# Patient Record
Sex: FEMALE | Employment: OTHER | URBAN - METROPOLITAN AREA
[De-identification: names, ages, dates, MRNs, and addresses within clinical notes are randomized per-mention and may not be internally consistent; named-entity substitution may affect disease eponyms.]

---

## 2021-01-06 LAB
CREATININE (EXTERNAL): 1.29 MG/DL (ref 0.57–1.11)
GFR ESTIMATED (EXTERNAL): 50 ML/MIN/1.73M2
GLUCOSE (EXTERNAL): 87 MG/DL (ref 70–100)
POTASSIUM (EXTERNAL): 4.3 MMOL/L (ref 3.5–5.1)

## 2021-06-29 ENCOUNTER — TRANSFERRED RECORDS (OUTPATIENT)
Dept: HEALTH INFORMATION MANAGEMENT | Facility: CLINIC | Age: 46
End: 2021-06-29
Payer: MEDICARE

## 2021-07-01 ENCOUNTER — TRANSFERRED RECORDS (OUTPATIENT)
Dept: HEALTH INFORMATION MANAGEMENT | Facility: CLINIC | Age: 46
End: 2021-07-01
Payer: MEDICARE

## 2021-07-01 LAB
CREATININE (EXTERNAL): 1.07 MG/DL (ref 0.57–1.11)
GFR ESTIMATED (EXTERNAL): >60 ML/MIN/1.73M2
GLUCOSE (EXTERNAL): 92 MG/DL (ref 70–100)
POTASSIUM (EXTERNAL): 4.8 MMOL/L (ref 3.5–5.1)

## 2021-08-03 ENCOUNTER — TRANSFERRED RECORDS (OUTPATIENT)
Dept: HEALTH INFORMATION MANAGEMENT | Facility: CLINIC | Age: 46
End: 2021-08-03

## 2021-08-04 ENCOUNTER — REFERRAL (OUTPATIENT)
Dept: TRANSPLANT | Facility: CLINIC | Age: 46
End: 2021-08-04

## 2021-08-05 PROBLEM — Z79.899 OTHER LONG TERM (CURRENT) DRUG THERAPY: Status: ACTIVE | Noted: 2019-11-18

## 2021-08-05 PROBLEM — N18.30 CHRONIC KIDNEY DISEASE, STAGE 3 (H): Status: ACTIVE | Noted: 2021-08-05

## 2021-08-05 PROBLEM — K58.9 IBS (IRRITABLE BOWEL SYNDROME): Status: ACTIVE | Noted: 2021-08-05

## 2021-08-05 PROBLEM — G25.81 RLS (RESTLESS LEGS SYNDROME): Status: ACTIVE | Noted: 2021-08-05

## 2021-08-05 NOTE — TELEPHONE ENCOUNTER
August 5, 2021 11:41 AM -  [id number]:   I received a referral for a patient to establish care with a post K/P transplant provider. She was transplanted at [facility] in 2016 and saw a provider in [place] until her recent move to [place]. She would like to establish care with a post tx provider that can see her in [place]. I launched [facility] and sent her a code to sign up for [facility].

## 2021-10-28 ENCOUNTER — TELEPHONE (OUTPATIENT)
Dept: TRANSPLANT | Facility: CLINIC | Age: 46
End: 2021-10-28

## 2021-10-28 NOTE — TELEPHONE ENCOUNTER
Provider Call: General  Route to LPN    Reason for call: Call from [facility]  Dr Chacon wanted them to check with status of her transfer of care and to see Dr Root  Referral was sent in Aug 2021.  Intake received  records  But should be transfer of care not new transplant referral  They closed referral     Call back needed? Yes    Return Call Needed  Same as documented in contacts section  When to return call?: Greater than one day: Route standard priority

## 2021-10-29 NOTE — TELEPHONE ENCOUNTER
RNCC spoke with nursing at [facility].  Explained delinquency of transfer of care. Dr. Chacon discussed with Lisbeth - would like to follow at [facility].     Will discuss with Lisbeth if she intends to have any ongoing care at [facility]; found in CE:  Marked as Not Followed on 11/18/2019 (by [facility])  Reason: Transferred to Another Center       Kidney, Pancreas Coordinator  Maritza Bhagat, ED    Phone: [phone]            Most recent labs in CE:  Tacro at goal.   Serum creatinine 1.12, at baseline per [facility] nephrology note 2019.  Lipase WNL, at baseline per [facility] nephrology note 2019.  Surgical discharge note available from [facility] available in [facility] from dated 11/7/16.    Labs from [facility] available from 1/21 to 9/21 (no results from 1/21 to 7/21).    Transfer to [place] March 2020.    Will need:   full 1 year's worth of transplant labs.   HLA typing  Operative note from [facility].   Sign up for [facility] does meet criteria for transfer to [facility] SOT.   Will establish care with Dr. Root at [facility] outreach: in-person 11/19 at .      Will need:  Orders placed per protocol under Dr. Root's name & sent to patient's lab of choice.

## 2021-11-04 NOTE — TELEPHONE ENCOUNTER
[facility] spoke with Lisbeth.   She is would like to pursue transfer and will connect with [facility] to obtain most recent 1 years lab work and most recent transplant nephrology note.    Lisbeth signed up for [employer] and was able to view the appointment with Dr. Root on 11/19.  [facility] notified her of her transplant coordinator contact.  [facility] advised Lisbeth to place her insurance information into our system via [employer].    [facility] reached out to [facility] for HLA typing as well as surgical operative report, but [facility] offices close at 4p. Will try again tomorrow.

## 2021-11-05 NOTE — LETTER
OUTPATIENT LABORATORY TEST ORDER:  After First Year    Patient Name: Lisbeth Umaña                             Transplant Date: 11/7/2016  YOB: 1975                                               Issue Date & Time: 11/5/2021 1052am    [facility] MR: [id number]                                                 Exp. Date (1 year after date issued)    Diagnoses:   Kidney Transplant (ICD-10  Z94.0)      Pancreas Transplant (ICD-10  Z94.83)     Long term use of medications (ICD-10  Z79.899)              Lab results to be available on the same day drawn.   Please release results to patient upon their request    Please fax to the Transplant Center at [phone].    Monthly   Complete Blood Count with Platelets    Basic Metabolic Panel (Sodium, Potassium, Chloride, CO2, Creatinine, Urea Nitrogen, Glucose, Calcium)   Amylase, Lipase   /Tacrolimus/Prograf drug level     Every 6 months            BK PCR Quantitative/Serum               Complete Lipid Panel fasting (Cholesterol, Triglycerides, HDL, LDL)            Glycosylated Hemoglobin (A1c)              Urine for Protein/Creatinine    Yearly         DSA/PRA  1. Please draw 20ml of blood in red top (plain) tube for Antileukocyte Antibody (ALA / PRA).  2. Label tubes with the patient s name, complete lab slip.    3. Mailers, lab slips with instructions are sent to patient separately.  4. Call the Outreach Lab at [phone] to reorder mailers.  5. Mail blood to (this address is also on the mailers):  IMMUNOLOGY LABORATORY  [facility]  [address]    [address] [place]  [address]    If you have any questions, please call The Transplant Center at [phone] or [phone].      .

## 2021-11-05 NOTE — TELEPHONE ENCOUNTER
PCS left contacted [facility], awaiting response back regarding receipt of HLA and operative report.     Response received. Records requested for retrieval from [facility] - HLA Typing, Donor DSAs.    Received communication from [place] transplant coordinator - fax number given, she will be sending operative report they received from [facility], transplant nephrology note and 1 years worth of labs.

## 2021-11-19 ENCOUNTER — OFFICE VISIT (OUTPATIENT)
Dept: NEPHROLOGY | Facility: CLINIC | Age: 46
End: 2021-11-19
Payer: MEDICARE

## 2021-11-19 VITALS
BODY MASS INDEX: 39.45 KG/M2 | HEIGHT: 68 IN | DIASTOLIC BLOOD PRESSURE: 86 MMHG | SYSTOLIC BLOOD PRESSURE: 114 MMHG | HEART RATE: 66 BPM | WEIGHT: 260.3 LBS

## 2021-11-19 DIAGNOSIS — R06.09 DYSPNEA ON EXERTION: ICD-10-CM

## 2021-11-19 DIAGNOSIS — D84.9 IMMUNOSUPPRESSION (H): Primary | ICD-10-CM

## 2021-11-19 DIAGNOSIS — Z94.83 PANCREAS REPLACED BY TRANSPLANT (H): ICD-10-CM

## 2021-11-19 DIAGNOSIS — Z94.0 KIDNEY REPLACED BY TRANSPLANT: ICD-10-CM

## 2021-11-19 DIAGNOSIS — E66.01 MORBID OBESITY (H): ICD-10-CM

## 2021-11-19 DIAGNOSIS — Z48.298 AFTERCARE FOLLOWING ORGAN TRANSPLANT: ICD-10-CM

## 2021-11-19 DIAGNOSIS — N18.31 STAGE 3A CHRONIC KIDNEY DISEASE (H): ICD-10-CM

## 2021-11-19 DIAGNOSIS — E55.9 VITAMIN D DEFICIENCY: ICD-10-CM

## 2021-11-19 PROCEDURE — 99205 OFFICE O/P NEW HI 60 MIN: CPT | Performed by: INTERNAL MEDICINE

## 2021-11-19 RX ORDER — TACROLIMUS 1 MG/1
CAPSULE ORAL
COMMUNITY
End: 2022-05-09

## 2021-11-19 RX ORDER — FUROSEMIDE 20 MG
20 TABLET ORAL DAILY PRN
COMMUNITY
Start: 2021-08-13

## 2021-11-19 RX ORDER — PREDNISONE 5 MG/1
5 TABLET ORAL DAILY
COMMUNITY
Start: 2020-11-24

## 2021-11-19 RX ORDER — CALCIUM CARBONATE 750 MG/1
600 TABLET, CHEWABLE ORAL 2 TIMES DAILY
COMMUNITY

## 2021-11-19 RX ORDER — ERGOCALCIFEROL 1.25 MG/1
50000 CAPSULE ORAL WEEKLY
COMMUNITY
Start: 2021-02-25 | End: 2022-02-25

## 2021-11-19 RX ORDER — SULFAMETHOXAZOLE/TRIMETHOPRIM 800-160 MG
1 TABLET ORAL PRN
COMMUNITY
Start: 2021-08-03

## 2021-11-19 RX ORDER — CYCLOBENZAPRINE HCL 10 MG
10 TABLET ORAL PRN
COMMUNITY
Start: 2021-02-25 | End: 2022-02-25

## 2021-11-19 RX ORDER — HYOSCYAMINE SULFATE 0.125 MG
0.12 TABLET ORAL PRN
COMMUNITY
Start: 2021-02-25 | End: 2022-02-25

## 2021-11-19 RX ORDER — TRAZODONE HYDROCHLORIDE 100 MG/1
100 TABLET ORAL AT BEDTIME
COMMUNITY
Start: 2020-11-24

## 2021-11-19 RX ORDER — DOCUSATE SODIUM 100 MG/1
100 CAPSULE, LIQUID FILLED ORAL PRN
COMMUNITY

## 2021-11-19 RX ORDER — ONDANSETRON 4 MG/1
4 TABLET, FILM COATED ORAL EVERY 8 HOURS PRN
COMMUNITY

## 2021-11-19 RX ORDER — LEVOTHYROXINE SODIUM 200 UG/1
200 TABLET ORAL DAILY
COMMUNITY
Start: 2021-08-03 | End: 2023-07-06

## 2021-11-19 RX ORDER — GABAPENTIN 800 MG/1
800 TABLET ORAL 2 TIMES DAILY
COMMUNITY
Start: 2021-02-25 | End: 2024-07-26

## 2021-11-19 RX ORDER — ESTRADIOL 10 UG/1
1 INSERT VAGINAL PRN
COMMUNITY
Start: 2020-12-05 | End: 2024-07-26

## 2021-11-19 ASSESSMENT — MIFFLIN-ST. JEOR: SCORE: 1874.21

## 2021-11-19 NOTE — LETTER
11/19/2021     RE: Lisbeth Umaña  [address] [place] [address]     Dear Colleague,    Thank you for referring your patient, Lisbeth Umaña, to the [facility] K[facility] at [facility]. Please see a copy of my visit note below.    TRANSPLANT NEPHROLOGY CONSULT NOTE    Assessment & Plan   # DDKT (SPK): Stable   - Baseline Creatinine  ~ 1.1-1.3   - Proteinuria: Normal (<0.2 grams)   - Date DSA Last Checked: Not Known      Latest DSA: Transplanted at outside Transplant Program   - BK Viremia: No   - Kidney Tx Biopsy: 2017; Results: (Per Patient) No diagnostic evidence of acute rejection.    # Pancreas Tx (SPK):    - Pancreatic Exocrine Drainage: Enteric drained     - Blood glucose: Euglycemia      On insulin: No   - HbA1c: Stable      Latest HbA1c: 5.2%   - Pancreatic enzymes: Stable   - Date DSA Last Checked: Not Known  Latest DSA: Transplanted at outside Transplant Program   - Pancreas Tx Biopsy: No    # Immunosuppression: Tacrolimus immediate release (goal 5-8), Mycophenolic acid (dose 540 mg every 12 hours) and Prednisone (dose 5 mg daily)          - Continue with intensive monitoring of immunosuppression for efficacy and toxicity.          - Changes: No    # Infection Prophylaxis:   Last CD4 Level: Not checked  - PJP: None    # Blood Pressure: Controlled;  Goal BP: < 130/80   - Changes: No; Patient is on furosemide for chronic edema only    # Mineral Bone Disorder:   - Vitamin D; level: Low        On supplement: Yes  - Calcium; level: Normal        On supplement: Yes    # Recurrent UTIs: Patient has associated this frequently with post coitus episodes and will take Bactrim for prophylaxis.  Stable symptoms.   - Will check CD4 and IgG levels to gauge level of immunosuppression to see if that might be contributing.    # Obesity, Class II (BMI = 39.6): Stable weight over the last year, but patient reports gaining about 90 lbs over the last 5  years since her transplant.   - Recommend weight loss for overall health by increasing exercise and watching caloric intake.    # Dyspnea on Exertion: This has been a chronic problem for the patient for years.  She has been evaluated previously by Pulmonary and Cardiology with no obvious cause.  Patient was tried on inhalers without improvement and only developed side effects from them.  Overall stable, but ongoing symptoms.    # Diabetic Gastroparesis: Patients reports having to watch a strict gastroparesis diet or she will get symptoms.  Stable symptoms lately with only occasional episodes.    # GERD: Controlled on omeprazole.    # Irritable Bowel Syndrome and Gastroparesis Dumping Syndrome: Intermittent diarrhea and constipation symptoms.  More so diarrheal episodes recently, especially with dumping syndrome with her diabetes if she doesn't follow a gastroparesis diet.    # Chronic Peripheral Neuropathy and Lower Extremity Edema: Stable on furosemide and gabapentin.    # Chronic Pain: Patient reports chronic pain for several reasons, including chronic lower back pain, knee pain and shoulder pain.  She has recently received steroid injections with some improvement in symptoms.  Followed by Pain Clinic.    # Skin Cancer Risk:    - Discussed sun protection and recommend regular follow up with Dermatology.    # Medical Compliance: Yes    # COVID-19 Virus Review: Discussed COVID-19 virus and the potential medical risks.  Reviewed preventative health recommendations, including wearing a mask where appropriate.  Recommended COVID vaccination should be up to date with either an initial vaccination or booster shot when appropriate.  Asked the patient to inform the transplant center if they are exposed or diagnosed with this virus.    # COVID Vaccination Up To Date: Yes     # Transplant History:  Etiology of Kidney Failure: Diabetes mellitus type 1  Tx: SPK  Transplant: 11/7/2016 (Kidney / Pancreas)  Significant changes in  "immunosuppression: None  Significant transplant-related complications: Recurrent UTIs      Transplant Office Phone Number: [phone]    Assessment and plan was discussed with the patient and she voiced her understanding and agreement.    Return visit: Return in about 1 year (around 11/19/2022).    Andres Root MD      Reason for Consult   Lisbeth Umaña was seen in consultation at the request of Dr. Can for kidney transplant, pancreas transplant and immunosuppression management.    Chief Complaint   Ms. Umaña is a 46 year old here for kidney transplant, pancreas transplant and immunosuppression management.    History of Present Illness    Ms. Umaña has a long-standing history of diabetes mellitus type 1 diagnosed in 1984 at age 9 complicated by triopathy and gastroparesis.  She developed end stage kidney disease and was previously on HD, now s/p SPK 11/7/16 at [facility].  Her induction was with basiliximab.  She was CMV discordant (D+/R-).  Patient reports doing pretty well post transplant, but had an episode of pancreatitis that required readmission, although no treatment for rejection.  She also notes a few months after transplant her creatinine increased from baseline and patient reports having a kidney transplant biopsy done that was reportedly unremarkable, although there is no report of a biopsy in her transplant records.  Her baseline creatinine has been ~ 1.0-1.2 on tacrolimus, mycophenolic acid and prednisone.    She also has a history of recurrent UTIs, morbid obesity, chronic pain, as well as her diabetic complications.  Patient recently moved to the [facility] and wants to establish care with a transplant center.    Her energy level is \"not great,\" although no recent change.  She is active, but really gets minimal exercise.  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  This has led her to gain ~ 90 lbs since her transplant 5 years ago.  Patient denies any chest pain, but continues to have shortness of breath with even minimal exertion.  These symptoms have progressively worsened since her transplant.  She has previously been worked up by a pulmonologist with a V/Q scan and PFTs, along with a Cardiologist with a cardiac stress test done, although those results are not available at this time.  Patient was tried on pulmonary inhalers without any improvement, but did develop some side effects, so she stopped them.  There are also significant reports of ongoing stress at work because of these symptoms and patient reports she had to quit being a nurse due to ongoing shortness of breath at work.  Patient also reports ongoing chronic joint pain, which she feels is related to the shortness of breath.  She was seen by a Rheumatologist and reportedly had an unremarkable work up.  More recently she was seen in Pain Clinic and received some steroid injections, which has really helped her knee and shoulder pain, as well as some improvement in her chronic back pain.  At present, it seems her symptoms are stable to improved.  In addition, patient has had ongoing lower extremity edema.  She had hypertension prior to transplant, but this resolved after her transplant and now she takes furosemide just for her leg swelling, but she has to be careful not to take too much or her blood pressure will go as low as the 80s systolic.  She generally runs in the 110s systolic now.    Appetite is okay with stable weight lately.  If she follows her gastroparesis diet, she only occasionally gets nausea and vomiting.  She has a history of irritable bowel syndrome, mostly with diarrhea lately and will get loose stools from gastroparesis dumping if she doesn't watch her diet.  No bloody or black, tarry stools.  Heartburn is controlled on PPI.  No fever, sweats or chills.    Recent Hospitalizations:  [x] No  [] Yes    New Medical Issues: [x] No [] Yes    Decreased energy: [] No [x] Yes    Chest pain or SOB with exertion:  [] No [x] Yes    Appetite change or weight change: [x] No [] Yes    Nausea, vomiting or diarrhea:  [] No [x] Yes    Fever, sweats or chills: [x] No [] Yes    Leg swelling: [] No [x] Yes      Home BP: 110/60-70s    Review of Systems   A comprehensive review of systems was obtained and negative, except as noted in the HPI or PMH.    Problem List   Patient Active Problem List   Diagnosis     Chronic kidney disease, stage 3 (H)     Diabetic gastroparesis (H)     Irritable bowel syndrome     Immunosuppression (H)     Kidney replaced by transplant     Pancreas replaced by transplant (H)     Diabetes mellitus type 1 (H)     Morbid obesity (H)     Recurrent UTI (urinary tract infection)     Hypothyroidism due to Hashimoto's thyroiditis     GERD (gastroesophageal reflux disease)     Female stress incontinence     Diabetic retinopathy of both eyes (H)     Diabetic peripheral neuropathy (H)     Chronic edema     Chronic diarrhea     Vitamin D deficiency     Aftercare following organ transplant     Dyspnea on exertion     Chronic pain       Past Medical History    Past Medical History:   Diagnosis Date     Anemia in chronic renal disease      Chronic diarrhea      Chronic edema      Chronic kidney disease, stage 3 (H) 08/05/2021     Diabetes mellitus type 1 (H)      Diabetic gastroparesis (H)      Diabetic peripheral neuropathy (H)      Diabetic retinopathy of both eyes (H)      End stage renal disease (H)      Female stress incontinence      Fibrocystic breast disease      GERD (gastroesophageal reflux disease)      Hypertension, renal      Hypothyroidism due to Hashimoto's thyroiditis      Immunosuppression (H)      Irritable bowel syndrome      Kidney replaced by transplant      Morbid obesity (H)      Pancreas replaced by transplant (H)      Recurrent UTI (urinary tract infection)      Secondary renal  hyperparathyroidism (H)      Seizure due to hypoglycemia (H)      Vitamin D deficiency        Past Surgical History   Past Surgical History:   Procedure Laterality Date     ADENOIDECTOMY       KIDNEY AND PANCREAS COMBINED TRANSPLANTATION       SHOULDER SURGERY Right      WISDOM TOOTH EXTRACTION         Family History   Family History   Problem Relation Age of Onset     Hypertension Mother      Diabetes Type 2  Mother      Kidney Disease Mother         Glucosuria     Gout Mother      Hypertension Father      Diabetes Type 2  Father      Prostate Cancer Father      Sarcoidosis Father      Hypertension Brother      Liver Disease Brother      Breast Cancer Maternal Grandmother        Social History   Social History     Tobacco Use     Smoking status: Former Smoker     Smokeless tobacco: Never Used   Substance Use Topics     Alcohol use: Not Currently     Alcohol/week: 0.0 - 1.0 standard drinks     Drug use: Never       Allergies   Allergies   Allergen Reactions     Iletin Ii Nph, Hives     Nitrofurantoin Hives, Itching and Nausea     Penicillins Hives       Medications   Current Outpatient Medications   Medication Sig     calcium carbonate 750 MG CHEW Take 600 mg by mouth 2 times daily     cyclobenzaprine (FLEXERIL) 10 MG tablet Take 10 mg by mouth as needed     docusate sodium (DSS) 100 MG capsule Take 100 mg by mouth as needed     ergocalciferol (ERGOCALCIFEROL) 1.25 MG (66035 UT) capsule Take 50,000 Units by mouth once a week     estradiol (VAGIFEM) 10 MCG TABS vaginal tablet Place 1 tablet vaginally twice a week     furosemide (LASIX) 20 MG tablet Take 20 mg by mouth every morning     gabapentin (NEURONTIN) 800 MG tablet Take 800 mg by mouth 2 times daily     hyoscyamine (LEVSIN) 0.125 MG tablet Take 0.125 mg by mouth as needed     levothyroxine (SYNTHROID) 200 MCG tablet Take 200 mcg by mouth daily     lidocaine (XYLOCAINE) 2 % external gel as needed     Multiple Vitamin (MULTIVITAMIN ADULT PO) Take by mouth daily  "    Mycophenolate Mofetil (CELLCEPT PO) Take 540 mg by mouth 2 times daily 180 mg tablets.     omeprazole (PRILOSEC) 20 MG DR capsule Take 20 mg by mouth daily     ondansetron (ZOFRAN) 4 MG tablet Take 4 mg by mouth every 8 hours as needed     predniSONE (DELTASONE) 5 MG tablet Take 5 mg by mouth daily     sulfamethoxazole-trimethoprim (BACTRIM DS) 800-160 MG tablet Take 1 tablet by mouth as needed     tacrolimus (GENERIC EQUIVALENT) 1 MG capsule      traZODone (DESYREL) 100 MG tablet Take 100 mg by mouth At Bedtime     No current facility-administered medications for this visit.     There are no discontinued medications.    Physical Exam   Vital Signs: /86 (BP Location: Right arm, Patient Position: Sitting, Cuff Size: Adult Regular)   Pulse 66   Ht 1.727 m (5' 8\")   Wt 118.1 kg (260 lb 4.8 oz)   BMI 39.58 kg/m      GENERAL APPEARANCE: alert and no distress  HENT: mouth without ulcers or lesions  LYMPHATICS: no cervical or supraclavicular nodes  RESP: lungs clear to auscultation - no rales, rhonchi or wheezes  CV: regular rhythm, normal rate, no rub, no murmur  EDEMA: trace LE edema bilaterally  ABDOMEN: soft, nondistended, nontender, bowel sounds normal, morbidly obese  MS: extremities normal - no gross deformities noted, no evidence of inflammation in joints, no muscle tenderness  SKIN: no rash  TX KIDNEY: normal  DIALYSIS ACCESS:  LUE AV fistula with good thrill    Data     Renal Latest Ref Rng & Units 7/1/2021 1/6/2021   K (external) 3.5 - 5.1 mmol/L 4.8 4.3   Cr (external) 0.57 - 1.11 mg/dL 1.07 1.29(A)   Glucose (external) 70 - 100 mg/dL 92 87     Again, thank you for allowing me to participate in the care of your patient.      Sincerely,    Andres Root MD  "

## 2021-11-20 PROBLEM — R06.09 DYSPNEA ON EXERTION: Status: ACTIVE | Noted: 2021-11-20

## 2021-11-20 PROBLEM — G25.81 RLS (RESTLESS LEGS SYNDROME): Status: RESOLVED | Noted: 2021-08-05 | Resolved: 2021-11-20

## 2021-11-20 PROBLEM — Z48.298 AFTERCARE FOLLOWING ORGAN TRANSPLANT: Status: ACTIVE | Noted: 2021-11-20

## 2021-11-20 PROBLEM — K21.9 GERD (GASTROESOPHAGEAL REFLUX DISEASE): Status: ACTIVE | Noted: 2021-11-20

## 2021-11-20 PROBLEM — E11.319 DIABETIC RETINOPATHY OF BOTH EYES (H): Status: ACTIVE | Noted: 2021-11-20

## 2021-11-20 PROBLEM — K52.9 CHRONIC DIARRHEA: Status: ACTIVE | Noted: 2021-11-20

## 2021-11-20 PROBLEM — G89.29 CHRONIC PAIN: Status: ACTIVE | Noted: 2021-11-20

## 2021-11-20 PROBLEM — E55.9 VITAMIN D DEFICIENCY: Status: ACTIVE | Noted: 2021-11-20

## 2021-11-20 PROBLEM — Z79.899 OTHER LONG TERM (CURRENT) DRUG THERAPY: Status: RESOLVED | Noted: 2019-11-18 | Resolved: 2021-11-20

## 2021-11-20 PROBLEM — E11.42 DIABETIC PERIPHERAL NEUROPATHY (H): Status: ACTIVE | Noted: 2021-11-20

## 2021-11-20 PROBLEM — E06.3 HYPOTHYROIDISM DUE TO HASHIMOTO'S THYROIDITIS: Status: ACTIVE | Noted: 2021-11-20

## 2021-11-20 PROBLEM — E66.01 MORBID OBESITY (H): Status: ACTIVE | Noted: 2021-11-20

## 2021-11-20 PROBLEM — N39.3 FEMALE STRESS INCONTINENCE: Status: ACTIVE | Noted: 2021-11-20

## 2021-11-20 PROBLEM — R60.9 CHRONIC EDEMA: Status: ACTIVE | Noted: 2021-11-20

## 2021-11-20 PROBLEM — N39.0 RECURRENT UTI (URINARY TRACT INFECTION): Status: ACTIVE | Noted: 2021-11-20

## 2021-11-20 NOTE — PROGRESS NOTES
TRANSPLANT NEPHROLOGY CONSULT NOTE    Assessment & Plan   # DDKT (SPK): Stable   - Baseline Creatinine  ~ 1.1-1.3   - Proteinuria: Normal (<0.2 grams)   - Date DSA Last Checked: Not Known      Latest DSA: Transplanted at outside Transplant Program   - BK Viremia: No   - Kidney Tx Biopsy: 2017; Results: (Per Patient) No diagnostic evidence of acute rejection.    # Pancreas Tx (SPK):    - Pancreatic Exocrine Drainage: Enteric drained     - Blood glucose: Euglycemia      On insulin: No   - HbA1c: Stable      Latest HbA1c: 5.2%   - Pancreatic enzymes: Stable   - Date DSA Last Checked: Not Known  Latest DSA: Transplanted at outside Transplant Program   - Pancreas Tx Biopsy: No    # Immunosuppression: Tacrolimus immediate release (goal 5-8), Mycophenolic acid (dose 540 mg every 12 hours) and Prednisone (dose 5 mg daily)          - Continue with intensive monitoring of immunosuppression for efficacy and toxicity.          - Changes: No    # Infection Prophylaxis:   Last CD4 Level: Not checked  - PJP: None    # Blood Pressure: Controlled;  Goal BP: < 130/80   - Changes: No; Patient is on furosemide for chronic edema only    # Mineral Bone Disorder:   - Vitamin D; level: Low        On supplement: Yes  - Calcium; level: Normal        On supplement: Yes    # Recurrent UTIs: Patient has associated this frequently with post coitus episodes and will take Bactrim for prophylaxis.  Stable symptoms.   - Will check CD4 and IgG levels to gauge level of immunosuppression to see if that might be contributing.    # Obesity, Class II (BMI = 39.6): Stable weight over the last year, but patient reports gaining about 90 lbs over the last 5 years since her transplant.   - Recommend weight loss for overall health by increasing exercise and watching caloric intake.    # Dyspnea on Exertion: This has been a chronic problem for the patient for years.  She has been evaluated previously by Pulmonary and Cardiology with no obvious cause.  Patient  was tried on inhalers without improvement and only developed side effects from them.  Overall stable, but ongoing symptoms.    # Diabetic Gastroparesis: Patients reports having to watch a strict gastroparesis diet or she will get symptoms.  Stable symptoms lately with only occasional episodes.    # GERD: Controlled on omeprazole.    # Irritable Bowel Syndrome and Gastroparesis Dumping Syndrome: Intermittent diarrhea and constipation symptoms.  More so diarrheal episodes recently, especially with dumping syndrome with her diabetes if she doesn't follow a gastroparesis diet.    # Chronic Peripheral Neuropathy and Lower Extremity Edema: Stable on furosemide and gabapentin.    # Chronic Pain: Patient reports chronic pain for several reasons, including chronic lower back pain, knee pain and shoulder pain.  She has recently received steroid injections with some improvement in symptoms.  Followed by Pain Clinic.    # Skin Cancer Risk:    - Discussed sun protection and recommend regular follow up with Dermatology.    # Medical Compliance: Yes    # COVID-19 Virus Review: Discussed COVID-19 virus and the potential medical risks.  Reviewed preventative health recommendations, including wearing a mask where appropriate.  Recommended COVID vaccination should be up to date with either an initial vaccination or booster shot when appropriate.  Asked the patient to inform the transplant center if they are exposed or diagnosed with this virus.    # COVID Vaccination Up To Date: Yes     # Transplant History:  Etiology of Kidney Failure: Diabetes mellitus type 1  Tx: [facility]K  Transplant: 11/7/2016 (Kidney / Pancreas)  Significant changes in immunosuppression: None  Significant transplant-related complications: Recurrent UTIs      Transplant Office Phone Number: [phone]    Assessment and plan was discussed with the patient and she voiced her understanding and agreement.    Return visit: Return in about 1 year (around  "11/19/2022).    Andres Root MD      Reason for Consult   Lisbeth Umaña was seen in consultation at the request of Dr. Can for kidney transplant, pancreas transplant and immunosuppression management.    Chief Complaint   Ms. Umaña is a 46 year old here for kidney transplant, pancreas transplant and immunosuppression management.    History of Present Illness    Ms. Umaña has a long-standing history of diabetes mellitus type 1 diagnosed in 1984 at age 9 complicated by triopathy and gastroparesis.  She developed end stage kidney disease and was previously on HD, now s/p SPK 11/7/16 at [facility].  Her induction was with basiliximab.  She was CMV discordant (D+/R-).  Patient reports doing pretty well post transplant, but had an episode of pancreatitis that required readmission, although no treatment for rejection.  She also notes a few months after transplant her creatinine increased from baseline and patient reports having a kidney transplant biopsy done that was reportedly unremarkable, although there is no report of a biopsy in her transplant records.  Her baseline creatinine has been ~ 1.0-1.2 on tacrolimus, mycophenolic acid and prednisone.    She also has a history of recurrent UTIs, morbid obesity, chronic pain, as well as her diabetic complications.  Patient recently moved to the [facility] and wants to establish care with a transplant center.    Her energy level is \"not great,\" although no recent change.  She is active, but really gets minimal exercise.  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  This has led her to gain ~ 90 lbs since her transplant 5 years ago.  Patient denies any chest pain, but continues to have shortness of breath with even minimal exertion.  These symptoms have progressively worsened since her transplant.  She has previously been worked up by a " pulmonologist with a V/Q scan and PFTs, along with a Cardiologist with a cardiac stress test done, although those results are not available at this time.  Patient was tried on pulmonary inhalers without any improvement, but did develop some side effects, so she stopped them.  There are also significant reports of ongoing stress at work because of these symptoms and patient reports she had to quit being a nurse due to ongoing shortness of breath at work.  Patient also reports ongoing chronic joint pain, which she feels is related to the shortness of breath.  She was seen by a Rheumatologist and reportedly had an unremarkable work up.  More recently she was seen in Pain Clinic and received some steroid injections, which has really helped her knee and shoulder pain, as well as some improvement in her chronic back pain.  At present, it seems her symptoms are stable to improved.  In addition, patient has had ongoing lower extremity edema.  She had hypertension prior to transplant, but this resolved after her transplant and now she takes furosemide just for her leg swelling, but she has to be careful not to take too much or her blood pressure will go as low as the 80s systolic.  She generally runs in the 110s systolic now.    Appetite is okay with stable weight lately.  If she follows her gastroparesis diet, she only occasionally gets nausea and vomiting.  She has a history of irritable bowel syndrome, mostly with diarrhea lately and will get loose stools from gastroparesis dumping if she doesn't watch her diet.  No bloody or black, tarry stools.  Heartburn is controlled on PPI.  No fever, sweats or chills.    Recent Hospitalizations:  [x] No [] Yes    New Medical Issues: [x] No [] Yes    Decreased energy: [] No [x] Yes    Chest pain or SOB with exertion:  [] No [x] Yes    Appetite change or weight change: [x] No [] Yes    Nausea, vomiting or diarrhea:  [] No [x] Yes    Fever, sweats or chills: [x] No [] Yes    Leg  swelling: [] No [x] Yes      Home BP: 110/60-70s    Review of Systems   A comprehensive review of systems was obtained and negative, except as noted in the HPI or PMH.    Problem List   Patient Active Problem List   Diagnosis     Chronic kidney disease, stage 3 (H)     Diabetic gastroparesis (H)     Irritable bowel syndrome     Immunosuppression (H)     Kidney replaced by transplant     Pancreas replaced by transplant (H)     Diabetes mellitus type 1 (H)     Morbid obesity (H)     Recurrent UTI (urinary tract infection)     Hypothyroidism due to Hashimoto's thyroiditis     GERD (gastroesophageal reflux disease)     Female stress incontinence     Diabetic retinopathy of both eyes (H)     Diabetic peripheral neuropathy (H)     Chronic edema     Chronic diarrhea     Vitamin D deficiency     Aftercare following organ transplant     Dyspnea on exertion     Chronic pain       Past Medical History    Past Medical History:   Diagnosis Date     Anemia in chronic renal disease      Chronic diarrhea      Chronic edema      Chronic kidney disease, stage 3 (H) 08/05/2021     Diabetes mellitus type 1 (H)      Diabetic gastroparesis (H)      Diabetic peripheral neuropathy (H)      Diabetic retinopathy of both eyes (H)      End stage renal disease (H)      Female stress incontinence      Fibrocystic breast disease      GERD (gastroesophageal reflux disease)      Hypertension, renal      Hypothyroidism due to Hashimoto's thyroiditis      Immunosuppression (H)      Irritable bowel syndrome      Kidney replaced by transplant      Morbid obesity (H)      Pancreas replaced by transplant (H)      Recurrent UTI (urinary tract infection)      Secondary renal hyperparathyroidism (H)      Seizure due to hypoglycemia (H)      Vitamin D deficiency        Past Surgical History   Past Surgical History:   Procedure Laterality Date     ADENOIDECTOMY       KIDNEY AND PANCREAS COMBINED TRANSPLANTATION       SHOULDER SURGERY Right      WISDOM TOOTH  EXTRACTION         Family History   Family History   Problem Relation Age of Onset     Hypertension Mother      Diabetes Type 2  Mother      Kidney Disease Mother         Glucosuria     Gout Mother      Hypertension Father      Diabetes Type 2  Father      Prostate Cancer Father      Sarcoidosis Father      Hypertension Brother      Liver Disease Brother      Breast Cancer Maternal Grandmother        Social History   Social History     Tobacco Use     Smoking status: Former Smoker     Smokeless tobacco: Never Used   Substance Use Topics     Alcohol use: Not Currently     Alcohol/week: 0.0 - 1.0 standard drinks     Drug use: Never       Allergies   Allergies   Allergen Reactions     Iletin Ii Nph, Hives     Nitrofurantoin Hives, Itching and Nausea     Penicillins Hives       Medications   Current Outpatient Medications   Medication Sig     calcium carbonate 750 MG CHEW Take 600 mg by mouth 2 times daily     cyclobenzaprine (FLEXERIL) 10 MG tablet Take 10 mg by mouth as needed     docusate sodium (DSS) 100 MG capsule Take 100 mg by mouth as needed     ergocalciferol (ERGOCALCIFEROL) 1.25 MG (97169 UT) capsule Take 50,000 Units by mouth once a week     estradiol (VAGIFEM) 10 MCG TABS vaginal tablet Place 1 tablet vaginally twice a week     furosemide (LASIX) 20 MG tablet Take 20 mg by mouth every morning     gabapentin (NEURONTIN) 800 MG tablet Take 800 mg by mouth 2 times daily     hyoscyamine (LEVSIN) 0.125 MG tablet Take 0.125 mg by mouth as needed     levothyroxine (SYNTHROID) 200 MCG tablet Take 200 mcg by mouth daily     lidocaine (XYLOCAINE) 2 % external gel as needed     Multiple Vitamin (MULTIVITAMIN ADULT PO) Take by mouth daily     Mycophenolate Mofetil (CELLCEPT PO) Take 540 mg by mouth 2 times daily 180 mg tablets.     omeprazole (PRILOSEC) 20 MG DR capsule Take 20 mg by mouth daily     ondansetron (ZOFRAN) 4 MG tablet Take 4 mg by mouth every 8 hours as needed     predniSONE (DELTASONE) 5 MG tablet Take 5  "mg by mouth daily     sulfamethoxazole-trimethoprim (BACTRIM DS) 800-160 MG tablet Take 1 tablet by mouth as needed     tacrolimus (GENERIC EQUIVALENT) 1 MG capsule      traZODone (DESYREL) 100 MG tablet Take 100 mg by mouth At Bedtime     No current facility-administered medications for this visit.     There are no discontinued medications.    Physical Exam   Vital Signs: /86 (BP Location: Right arm, Patient Position: Sitting, Cuff Size: Adult Regular)   Pulse 66   Ht 1.727 m (5' 8\")   Wt 118.1 kg (260 lb 4.8 oz)   BMI 39.58 kg/m      GENERAL APPEARANCE: alert and no distress  HENT: mouth without ulcers or lesions  LYMPHATICS: no cervical or supraclavicular nodes  RESP: lungs clear to auscultation - no rales, rhonchi or wheezes  CV: regular rhythm, normal rate, no rub, no murmur  EDEMA: trace LE edema bilaterally  ABDOMEN: soft, nondistended, nontender, bowel sounds normal, morbidly obese  MS: extremities normal - no gross deformities noted, no evidence of inflammation in joints, no muscle tenderness  SKIN: no rash  TX KIDNEY: normal  DIALYSIS ACCESS:  LUE AV fistula with good thrill    Data     Renal Latest Ref Rng & Units 7/1/2021 1/6/2021   K (external) 3.5 - 5.1 mmol/L 4.8 4.3   Cr (external) 0.57 - 1.11 mg/dL 1.07 1.29(A)   Glucose (external) 70 - 100 mg/dL 92 87                                  "

## 2021-11-23 ENCOUNTER — TELEPHONE (OUTPATIENT)
Dept: TRANSPLANT | Facility: CLINIC | Age: 46
End: 2021-11-23
Payer: MEDICARE

## 2021-11-23 NOTE — LETTER
OUTPATIENT LABORATORY TEST ORDER:  After First Year    Patient Name: Lisbeth Umaña                             Transplant Date: 11/7/2016  YOB: 1975                                               Issue Date & Time: 11/5/2021 1052am    [facility] MR: [id number]                                                 Exp. Date (1 year after date issued)    Diagnoses:   Kidney Transplant (ICD-10  Z94.0)      Pancreas Transplant (ICD-10  Z94.83)     Long term use of medications (ICD-10  Z79.899)              Lab results to be available on the same day drawn.   Please release results to patient upon their request    Please fax to the Transplant Center at [phone].    Monthly   Complete Blood Count with Platelets    Basic Metabolic Panel (Sodium, Potassium, Chloride, CO2, Creatinine, Urea Nitrogen, Glucose, Calcium)   Amylase, Lipase   /Tacrolimus/Prograf drug level     Every 6 months            BK PCR Quantitative/Serum               Complete Lipid Panel fasting (Cholesterol, Triglycerides, HDL, LDL)            Glycosylated Hemoglobin (A1c)              Urine for Protein/Creatinine    Yearly         DSA/PRA  1. Please draw 20ml of blood in red top (plain) tube for Antileukocyte Antibody (ALA / PRA).  2. Label tubes with the patient s name, complete lab slip.    3. Mailers, lab slips with instructions are sent to patient separately.  4. Call the Outreach Lab at [phone] to reorder mailers.  5. Mail blood to (this address is also on the mailers):  IMMUNOLOGY LABORATORY  [facility]  [address] [facility] [address]    [address] [place]  [address]    If you have any questions, please call The Transplant Center at [phone] or [phone].      .

## 2021-11-23 NOTE — LETTER
OUTPATIENT LABORATORY TEST ORDER:  After First Year    Patient Name: Lisbeth Umaña                             Transplant Date: 11/7/2016  YOB: 1975                                               Issue Date & Time: 11/5/2021 1052am    [facility] MR: [id number]                                                 Exp. Date (1 year after date issued)    Diagnoses:   Kidney Transplant (ICD-10  Z94.0)      Pancreas Transplant (ICD-10  Z94.83)     Long term use of medications (ICD-10  Z79.899)              Lab results to be available on the same day drawn.   Please release results to patient upon their request    Please fax to the Transplant Center at [phone].    Monthly   Complete Blood Count with Platelets    Basic Metabolic Panel (Sodium, Potassium, Chloride, CO2, Creatinine, Urea Nitrogen, Glucose, Calcium)   Amylase, Lipase   /Tacrolimus/Prograf drug level     Every 6 months            BK PCR Quantitative/Serum               Complete Lipid Panel fasting (Cholesterol, Triglycerides, HDL, LDL)            Glycosylated Hemoglobin (A1c)              Urine for Protein/Creatinine    Yearly         DSA/PRA  1. Please draw 20ml of blood in red top (plain) tube for Antileukocyte Antibody (ALA / PRA).  2. Label tubes with the patient s name, complete lab slip.    3. Mailers, lab slips with instructions are sent to patient separately.  4. Call the Outreach Lab at [phone] to reorder mailers.  5. Mail blood to (this address is also on the mailers):  IMMUNOLOGY LABORATORY  [facility]  [address] [facility] [address]    [address] [place]  [address]    If you have any questions, please call The Transplant Center at [phone] or [phone].      .

## 2021-11-23 NOTE — TELEPHONE ENCOUNTER
ISSUE:  Andres Root MD Duncanson, Sarah, RN    She will need a lab letter with our transplant labs per protocol.  Please check CMV IgG Ab level with her h/o CMV discordance, as well as check a CD4 and IgG levels.     PLAN:  Order for labs above faxed to [facility] lab, copy of letter as well as copy of standing transplant lab letter sent to patient via [employer] and via mail. Request sent for DSA/PRA  kit to be sent to patient.  Call to patient to update, left detailed VM.

## 2021-11-23 NOTE — LETTER
PHYSICIAN ORDERS      DATE & TIME ISSUED: 2021 8:42 AM  PATIENT NAME: Lisbeth Umaña   : 1975     [facility] MR# [if applicable]: [id number]     DIAGNOSIS:  Kidney Transplant, Pancreas Transplant  ICD-10 CODE: Z94.0, Z94.83     Please collect the following labs with next monthly routine transplant lab draw:  - CMV IgG Antibody level  - CD4 level  - IgG level    If you have any questions, please call The Transplant Center at [phone].      Please fax labs to [phone].      .

## 2021-12-12 ENCOUNTER — HEALTH MAINTENANCE LETTER (OUTPATIENT)
Age: 46
End: 2021-12-12

## 2022-01-12 ENCOUNTER — DOCUMENTATION ONLY (OUTPATIENT)
Dept: TRANSPLANT | Facility: CLINIC | Age: 47
End: 2022-01-12
Payer: MEDICARE

## 2022-02-06 ENCOUNTER — HEALTH MAINTENANCE LETTER (OUTPATIENT)
Age: 47
End: 2022-02-06

## 2022-04-03 ENCOUNTER — HEALTH MAINTENANCE LETTER (OUTPATIENT)
Age: 47
End: 2022-04-03

## 2022-04-13 ENCOUNTER — TELEPHONE (OUTPATIENT)
Dept: TRANSPLANT | Facility: CLINIC | Age: 47
End: 2022-04-13
Payer: MEDICARE

## 2022-04-13 NOTE — TELEPHONE ENCOUNTER
ISSUE:  HLA Typing, Donor DSA requested from [facility] x2 (December 2021, January 2022)  Date of exam/results: 2016 and 2017  Records not received to date.    PLAN:  Call to patient, requesting that she obtain results from [facility] and fax to transplant office [phone]    OUTCOME:  Call to patient, no answer, detailed message left with above information requesting call back if patient has questions.

## 2022-05-09 DIAGNOSIS — Z94.0 KIDNEY TRANSPLANTED: Primary | ICD-10-CM

## 2022-05-09 RX ORDER — MYCOPHENOLIC ACID 180 MG/1
540 TABLET, DELAYED RELEASE ORAL 2 TIMES DAILY
Qty: 180 TABLET | Refills: 11 | Status: SHIPPED | OUTPATIENT
Start: 2022-05-09 | End: 2022-06-08

## 2022-05-09 RX ORDER — TACROLIMUS 1 MG/1
CAPSULE ORAL
Qty: 150 CAPSULE | Refills: 11 | Status: SHIPPED | OUTPATIENT
Start: 2022-05-09 | End: 2022-06-08

## 2022-05-09 NOTE — TELEPHONE ENCOUNTER
Patient Call: Medication Refill  May need New scripts for MPA and Prograf   Is out right now     Pharmacy Name: [facility] [place], [place]     When will the patient be out of this medication?: Less than 3 days (Route high priority)

## 2022-05-11 ENCOUNTER — TELEPHONE (OUTPATIENT)
Dept: TRANSPLANT | Facility: CLINIC | Age: 47
End: 2022-05-11
Payer: MEDICARE

## 2022-05-11 NOTE — TELEPHONE ENCOUNTER
General  Route to LPN    Reason for call: [facility] called checking to see if patient is getting meds prescribed in [place] or should their pharmacy in [place] refill prograf/tacrolimus.    Writer advised med is being processed locally. Caller would like us to follow up with patient to have them cancel meds from them    Call back needed? Yes  Return Call Needed  Same as documented in contacts section  When to return call?: Same day: Route High Priority

## 2022-05-12 NOTE — TELEPHONE ENCOUNTER
Returned call to outside pharmacy.  Let them know that prograf was filled by [facility] SOT 5/9/2022    Marlene Mike RN   Transplant Coordinator  [phone]

## 2022-06-08 DIAGNOSIS — Z94.0 KIDNEY TRANSPLANTED: Primary | ICD-10-CM

## 2022-06-08 NOTE — LETTER
OUTPATIENT LABORATORY TEST ORDER:  After First Year    Patient Name: Lisbeth Umaña                             Transplant Date: 11/7/2016  YOB: 1975                                               Issue Date & Time: 6/8/2022  3:47 PM    [facility] MR: [id number]                                                 Exp. Date (1 year after date issued)    Diagnoses:   Kidney Transplant (ICD-10  Z94.0)      Pancreas Transplant (ICD-10  Z94.83)     Long term use of medications (ICD-10  Z79.899)              Lab results to be available on the same day drawn.   Please release results to patient upon their request    Please fax to the Transplant Center at [phone].    Monthly   Complete Blood Count with Platelets    Basic Metabolic Panel (Sodium, Potassium, Chloride, CO2, Creatinine, Urea Nitrogen, Glucose, Calcium)   Amylase, Lipase   /Tacrolimus/Prograf drug level     Every 6 months            Complete Lipid Panel fasting (Cholesterol, Triglycerides, HDL, LDL)            Glycosylated Hemoglobin (A1c)              Urine for Protein/Creatinine      If you have any questions, please call The Transplant Center at [phone] or [phone].      .

## 2022-06-08 NOTE — TELEPHONE ENCOUNTER
Medication Refill  Route to LPN  Instruct the patient to first contact their pharmacy. If they have called their pharmacy and require further assistance, route to LPN.    Pharmacy Name: [facility] - [place], [place] - [id number] [facility]    Name of Medication: mycophenolic acid (GENERIC EQUIVALENT) 180 MG EC tablet   Dose: Take 3 tablets (540 mg) by mouth 2 times daily, Disp-180 tablet, R-11, E-Prescribe      Name of Medication: tacrolimus (GENERIC EQUIVALENT) 1 MG capsule   Dose: Total dose = 3 mg in the AM and 2 mg in the PM, Disp-150 capsule, R-11, E-Prescribe    When will the patient be out of this medication?: Less than 24 hours ([facility] LPN, then page if no answer)

## 2022-06-09 RX ORDER — MYCOPHENOLIC ACID 180 MG/1
540 TABLET, DELAYED RELEASE ORAL 2 TIMES DAILY
Qty: 180 TABLET | Refills: 0 | Status: SHIPPED | OUTPATIENT
Start: 2022-06-09 | End: 2023-08-01

## 2022-06-09 RX ORDER — TACROLIMUS 1 MG/1
CAPSULE ORAL
Qty: 150 CAPSULE | Refills: 0 | Status: SHIPPED | OUTPATIENT
Start: 2022-06-09 | End: 2023-10-13

## 2022-07-24 ENCOUNTER — HEALTH MAINTENANCE LETTER (OUTPATIENT)
Age: 47
End: 2022-07-24

## 2022-10-03 ENCOUNTER — HEALTH MAINTENANCE LETTER (OUTPATIENT)
Age: 47
End: 2022-10-03

## 2022-11-08 ENCOUNTER — LAB (OUTPATIENT)
Dept: LAB | Facility: CLINIC | Age: 47
End: 2022-11-08

## 2022-11-08 PROCEDURE — 80197 ASSAY OF TACROLIMUS: CPT

## 2022-11-09 LAB
TACROLIMUS BLD-MCNC: 6.2 UG/L (ref 5–15)
TME LAST DOSE: NORMAL H
TME LAST DOSE: NORMAL H

## 2023-02-11 ENCOUNTER — HEALTH MAINTENANCE LETTER (OUTPATIENT)
Age: 48
End: 2023-02-11

## 2023-02-15 ENCOUNTER — TELEPHONE (OUTPATIENT)
Dept: TRANSPLANT | Facility: CLINIC | Age: 48
End: 2023-02-15
Payer: COMMERCIAL

## 2023-02-15 NOTE — TELEPHONE ENCOUNTER
Lisbeth calling to see if there is any resources that would help her with the cost of her medication. [facility] and the cost was 500 dollars and her tacrolimus was 74 dollars, it's never been that high before.

## 2023-02-16 ENCOUNTER — TELEPHONE (OUTPATIENT)
Dept: TRANSPLANT | Facility: CLINIC | Age: 48
End: 2023-02-16

## 2023-02-16 NOTE — TELEPHONE ENCOUNTER
Called Lisbeth back to give her [facility] number [phone]. No answer, left message with [facility]'s number

## 2023-02-16 NOTE — TELEPHONE ENCOUNTER
Lisbeth calling and has left a few messages and no call back yet she is concerned about her Tacrolimus unable to afford them and will out on Saturday 2/18/23 would really like to be called back asap

## 2023-02-17 ENCOUNTER — TELEPHONE (OUTPATIENT)
Dept: TRANSPLANT | Facility: CLINIC | Age: 48
End: 2023-02-17
Payer: COMMERCIAL

## 2023-02-17 NOTE — TELEPHONE ENCOUNTER
SW called pt back re: expensive Tac. Left message requesting a call back.     SW received call back from Anai. Assisted in calling her SABIAs. SW inquired if her immunosuppression was billed correctly. Anai states that she has Medicare and her spouse's BCBS. Anai is past 3 years post transplant so EGHP should be primary and immunosuppression will be billed to Medicare Part B secondary. Pharmacist sent this issue to their resolution team for further clarification.     Anai states that she will not be able to afford meds if they continue to be so much money-- SW asked that she call back if pharmacy is not able to resolve the issue. She was agreeable.     ADDENDUM: KOBY received call back from pharmacist, Nasir, at [facility]. Able to process medications through BCBS and Medicare Part B.     HARLAN Morgan, [facility]   Transplant   [facility]  Direct: [phone]  E-Mail: [email or web address]

## 2023-02-17 NOTE — TELEPHONE ENCOUNTER
Patient Call: General  Route to LPN    Reason for call: Mona from [facility] called in regards of need a diagnose code for medication tacrolimus 1MG. Call [phone] RX: [id number]  [facility] Location number: 3101    Call back needed? Yes    Return Call Needed  Same as documented in contacts section  When to return call?: Same day: Route High Priority

## 2023-03-06 ENCOUNTER — LAB (OUTPATIENT)
Dept: LAB | Facility: CLINIC | Age: 48
End: 2023-03-06
Payer: COMMERCIAL

## 2023-03-06 DIAGNOSIS — Z94.0 KIDNEY REPLACED BY TRANSPLANT: Primary | ICD-10-CM

## 2023-03-06 PROCEDURE — 80197 ASSAY OF TACROLIMUS: CPT | Performed by: INTERNAL MEDICINE

## 2023-03-07 LAB
TACROLIMUS BLD-MCNC: 7.2 UG/L (ref 5–15)
TME LAST DOSE: NORMAL H
TME LAST DOSE: NORMAL H

## 2023-05-19 ENCOUNTER — OFFICE VISIT (OUTPATIENT)
Dept: NEPHROLOGY | Facility: CLINIC | Age: 48
End: 2023-05-19
Payer: COMMERCIAL

## 2023-05-19 VITALS
WEIGHT: 269.6 LBS | BODY MASS INDEX: 40.99 KG/M2 | HEART RATE: 64 BPM | SYSTOLIC BLOOD PRESSURE: 118 MMHG | DIASTOLIC BLOOD PRESSURE: 78 MMHG

## 2023-05-19 DIAGNOSIS — E66.01 MORBID OBESITY (H): ICD-10-CM

## 2023-05-19 DIAGNOSIS — Z94.83 PANCREAS REPLACED BY TRANSPLANT (H): ICD-10-CM

## 2023-05-19 DIAGNOSIS — Z48.298 AFTERCARE FOLLOWING ORGAN TRANSPLANT: ICD-10-CM

## 2023-05-19 DIAGNOSIS — Z94.0 KIDNEY REPLACED BY TRANSPLANT: Primary | ICD-10-CM

## 2023-05-19 DIAGNOSIS — E55.9 VITAMIN D DEFICIENCY: ICD-10-CM

## 2023-05-19 DIAGNOSIS — D84.9 IMMUNOSUPPRESSED STATUS (H): ICD-10-CM

## 2023-05-19 DIAGNOSIS — N18.31 STAGE 3A CHRONIC KIDNEY DISEASE (H): ICD-10-CM

## 2023-05-19 PROCEDURE — 99214 OFFICE O/P EST MOD 30 MIN: CPT | Performed by: INTERNAL MEDICINE

## 2023-05-19 RX ORDER — CYCLOBENZAPRINE HCL 10 MG
10 TABLET ORAL
COMMUNITY
Start: 2022-11-14

## 2023-05-19 NOTE — LETTER
5/19/2023      RE: Lisbeth Umaña  [address] [place] [address]       TRANSPLANT NEPHROLOGY CHRONIC POST TRANSPLANT VISIT    Assessment & Plan   # DDKT (SPK): Variable with recent creatinine elevated, but repeat was back to baseline.  Unclear cause, but most likely related to hemodynamic issues and blood pressure.  Her baseline Cr had been running ~ 1.1-1.3, but over the last year or so, maybe a bit closer to ~ 1.2-1.4.  Would consider kidney transplant biopsy for creatinine 1.6 or greater.   - Baseline Creatinine:  ~ 1.2-1.4   - Proteinuria: Normal (<0.2 grams)   - Date DSA Last Checked: Not Known      Latest DSA: Transplanted at outside Transplant Program   - BK Viremia: Not checked recently due to time from transplant   - Kidney Tx Biopsy: 2017; Result: (Per Patient) No diagnostic evidence of acute rejection.     # Pancreas Tx (SPK):    - Pancreatic Exocrine Drainage: Enteric drained     - Blood glucose: Euglycemia      On insulin: No   - HbA1c: Stable      Latest HbA1c: 5.3%   - Pancreatic enzymes: Stable   - Date DSA Last Checked: Not Known  Latest DSA: Transplanted at outside Transplant Program   - Pancreas Tx Biopsy: No    # Immunosuppression: Tacrolimus immediate release (goal 5-8), Mycophenolic acid (dose 540 mg every 12 hours) and Prednisone (dose 5 mg daily)   - Continue with intensive monitoring of immunosuppression for efficacy and toxicity.   - Changes: No    # Infection Prophylaxis:   Last CD4 Level: 349 (Apr/2022)  - PJP: None    # Blood Pressure: Controlled, but low at times;  Goal BP: > 100, but < 130 systolic   - Changes: Not at this time, but if edema becomes an issue, could try midodrine with furosemide to prevent associated hypotension.    # Mineral Bone Disorder:   - Vitamin D; level: Low        On supplement: Yes  - Calcium; level: Normal        On supplement: Yes    # Dyspnea on Exertion: Appears to be stable for a few years now.  Likely due to deconditioning and obesity.   -  Recommend working on weight loss for overall health by increasing exercise and watching caloric intake.    # Obesity, Class III (BMI = 41.0): Patient with a continued trend up in weight.   - Recommend weight loss for overall health by increasing exercise and watching caloric intake.    # Recurrent UTIs: No recent episodes and denies any symptoms.    # GERD: Mostly controlled on PPI.    # Diabetic Gastroparesis: Mostly with am nausea, but no vomiting and stable symptoms.    # IBS: No recent issues per patient.    # Peripheral Neuropathy: Stable symptoms on gabapentin.    # Left Ovarian Cyst: Patient with large left ovarian cyst that has caused ongoing pain.  The cyst has been increasing in size.  She is followed closely by Gynecology with discussion of surgery to remove it.  Of note, the cyst seems to sit right on her transplanted kidney.   - Will discuss with Transplant Surgery any concerns for surgery to remove the ovary.    # Skin Cancer Risk:    - Discussed sun protection and recommend regular follow up with Dermatology.    # Medical Compliance: No.  Evidence of labs less than recommended and infrequent transplant follow-up.  - Discussed importance of checking labs regularly as recommended, taking medications as prescribed and attending scheduled medical appointments.    # Health Maintenance and Vaccination Review: Not Reviewed    # Transplant History:  Etiology of Kidney Failure: Diabetes mellitus type 1  Tx: SPK  Transplant: 11/7/2016 (Kidney / Pancreas)  Significant changes in immunosuppression: None  Significant transplant-related complications: Recurrent UTIs    Transplant Office Phone Number: [phone]    Assessment and plan was discussed with the patient and she voiced her understanding and agreement.    Return visit: Return in about 1 year (around 5/19/2024).    Andres Root MD    Chief Complaint   Ms. Umaña is a 47 year old here for kidney transplant, pancreas transplant and immunosuppression  management.    History of Present Illness    Ms. Umaña reports feeling fair overall with several medical complaints.  Since patient's initial visit in 2021, she has had several ongoing issues.  She underwent right carpal tunnel surgery, some foot surgeries, and has been dealing with a very large left ovarian cyst.  Patient is being followed closely by Gynecology for the cyst and has had several pelvic ultrasounds to follow up on it.  There is some discussion about doing surgery to remove it, but patient is concerned as it supposed sits right on her kidney transplant.  In addition, patient has also been dealing with chronic back pain.  She reports continued sciatic pain, right more then left, which really limits her activity.  She cannot even tie her shoes and has to have her  help her as she cannot bend over.  Patient did undergo nerve ablation, but that didn't make a big difference.  With her ongoing medical issues, she has found being a part of online support groups have really helped her mood.    Her energy level seems to wax and wane and mostly stable, but certainly not normal.  She is somewhat active, but really gets minimal exercise, mostly due to her chronic back pain.  Denies any chest pain, but some shortness of breath with exertion, which is unchanged and patient feels this is due to deconditioning.  Occasional leg swelling, but generally controlled if she watches her diet.  Patient will take furosemide as needed, but hasn't had to do so in a month or so.    Appetite is stable, but generally low in the morning and only later in the day does she get hungry and eats more.  She has had a slow trend up in her weight and now almost 270 lbs.  She reports frequent nausea in the morning, but no vomiting or diarrhea.  Occasional heartburn symptoms and does take omeprazole.  No fever, sweats or chills.  She will get night sweats at times, but has noticed that this generally only happens if she forgets to take  her gabapentin before bed.    Home BP: 110/60-80s.  She limits taking furosemide as it can make her lightheaded.    Problem List   Patient Active Problem List   Diagnosis     Chronic kidney disease, stage 3 (H)     Diabetic gastroparesis (H)     Irritable bowel syndrome     Immunosuppressed status (H)     Kidney replaced by transplant     Pancreas replaced by transplant (H)     Diabetes mellitus type 1 (H)     Morbid obesity (H)     Recurrent UTI (urinary tract infection)     Hypothyroidism due to Hashimoto's thyroiditis     GERD (gastroesophageal reflux disease)     Female stress incontinence     Diabetic retinopathy of both eyes (H)     Diabetic peripheral neuropathy (H)     Chronic edema     Chronic diarrhea     Vitamin D deficiency     Aftercare following organ transplant     Dyspnea on exertion     Chronic pain       Allergies   Allergies   Allergen Reactions     Iletin Ii Nph, Hives     Nitrofurantoin Hives, Itching and Nausea     Penicillins Hives     Levothyroxine Other (See Comments)     Patient states hair falls out, and nails break off, and she cannot take this. Only Synthroid.     Contrast Dye Nephrotoxicity and Other (See Comments)       Medications   Current Outpatient Medications   Medication Sig     calcium carbonate 750 MG CHEW Take 600 mg by mouth 2 times daily     cyclobenzaprine (FLEXERIL) 10 MG tablet Take 10 mg by mouth     docusate sodium (DSS) 100 MG capsule Take 100 mg by mouth as needed     estradiol (VAGIFEM) 10 MCG TABS vaginal tablet Place 1 tablet vaginally as needed     furosemide (LASIX) 20 MG tablet Take 20 mg by mouth daily as needed     gabapentin (NEURONTIN) 800 MG tablet Take 800 mg by mouth 2 times daily     levothyroxine (SYNTHROID) 200 MCG tablet Take 200 mcg by mouth daily Brand name only     lidocaine (XYLOCAINE) 2 % external gel as needed     Multiple Vitamin (MULTIVITAMIN ADULT PO) Take by mouth daily     mycophenolic acid (GENERIC EQUIVALENT) 180 MG EC tablet Take 3  tablets (540 mg) by mouth 2 times daily     omeprazole (PRILOSEC) 20 MG DR capsule Take 20 mg by mouth daily     ondansetron (ZOFRAN) 4 MG tablet Take 4 mg by mouth every 8 hours as needed     predniSONE (DELTASONE) 5 MG tablet Take 5 mg by mouth daily     sulfamethoxazole-trimethoprim (BACTRIM DS) 800-160 MG tablet Take 1 tablet by mouth as needed     tacrolimus (GENERIC EQUIVALENT) 1 MG capsule Total dose = 3 mg in the AM and 2 mg in the PM     traZODone (DESYREL) 100 MG tablet Take 100 mg by mouth At Bedtime     vitamin D3 (CHOLECALCIFEROL) 1.25 MG (94252 UT) capsule Take 50,000 Units by mouth     No current facility-administered medications for this visit.     Medications Discontinued During This Encounter   Medication Reason     Mycophenolate Mofetil (CELLCEPT PO) Alternate therapy       Physical Exam   Vital Signs: /78 (BP Location: Right arm, Patient Position: Sitting, Cuff Size: Adult Large)   Pulse 64   Wt 122.3 kg (269 lb 9.6 oz)   BMI 40.99 kg/m      GENERAL APPEARANCE: alert and no distress  HENT: mouth without ulcers or lesions  LYMPHATICS: no cervical or supraclavicular nodes  RESP: lungs clear to auscultation - no rales, rhonchi or wheezes  CV: regular rhythm, normal rate, no rub, no murmur  EDEMA: no LE edema bilaterally  ABDOMEN: soft, nondistended, nontender, bowel sounds normal, obese  MS: extremities normal - no gross deformities noted, no evidence of inflammation in joints, no muscle tenderness  SKIN: no rash  TX KIDNEY: normal  DIALYSIS ACCESS: none    Data         Latest Ref Rng & Units 3/6/2023    11:04 AM 11/2/2022     1:20 PM 4/18/2022     7:47 AM   Renal   Na (external) 136 - 146 mmol/L 138      140      140        K (external) 3.5 - 5.1 mmol/L 4.5      5.0      4.3        Cl 98 - 107 mmol/L 107      109      107        Cl (external) 98 - 107 mmol/L 107      109      107        CO2 (external) 22 - 29 mmol/L 22      23      27        BUN (external) 10.0 - 20.0 mg/dL 22.6      22.6       18.4        Cr (external) 0.57 - 1.11 mg/dL 1.43      1.19      1.05        Glucose (external) 70 - 100 mg/dL 82      90      81        Ca (external) 8.6 - 10.5 mg/dL 8.9      9.3             This result is from an external source.            Latest Ref Rng & Units 3/6/2023    11:04 AM 11/2/2022     1:20 PM 4/18/2022     7:47 AM   Heme   WBC (external) 3.7 - 12.1 10(3)/uL 5.8      6.3      6.8        Hgb (external) 11.2 - 15.8 g/dL 12.6      12.5      12.4        Plt (external) 179 - 450 10(3)/uL 218      239      291        ABSOLUTE NEUTROPHILS (EXTERNAL) 1.8 - 9.2 10(3)/uL 3.8      4.1      4.3        ABSOLUTE LYMPHOCYTES (EXTERNAL) 1.2 - 3.9 10(3)/uL 1.4      1.6      1.8        ABSOLUTE MONOCYTES (EXTERNAL) 0.0 - 1.1 10(3)/uL 0.5      0.5      0.5        ABSOLUTE EOSINOPHILS (EXTERNAL) 0.0 - 1.0 10(3)/uL 0.1      0.1      0.0        ABSOLUTE BASOPHILS (EXTERNAL) 0.0 - 0.2 10(3)/uL 0.0      0.0      0.0            This result is from an external source.            Latest Ref Rng & Units 3/6/2023    11:04 AM 11/2/2022     1:20 PM 4/18/2022     7:47 AM   Pancreas   A1C (external) <5.7 %  5.2         Amylase (external) 25 - 125 U/L 66      61      52        Lipase (external) 25 - 125 U/L 66      61      52            This result is from an external source.            Latest Ref Rng & Units 4/18/2022     7:47 AM   [facility]   CMV IgG Ext Negative Negative            This result is from an external source.        Recent Labs   Lab Test 11/08/22  0845 03/06/23  1104   DOSTAC 11/7/2022 3/5/2023   TACROL 6.2 7.2           Andres Root MD

## 2023-05-19 NOTE — LETTER
5/19/2023       RE: Lisbeth Umaña  [address] [place] [address]     Dear Colleague,    Thank you for referring your patient, Lisbeth Umaña, to the [facility] at [facility]. Please see a copy of my visit note below.    TRANSPLANT NEPHROLOGY CHRONIC POST TRANSPLANT VISIT    Assessment & Plan   # DDKT (SPK): Variable with recent creatinine elevated, but repeat was back to baseline.  Unclear cause, but most likely related to hemodynamic issues and blood pressure.  Her baseline Cr had been running ~ 1.1-1.3, but over the last year or so, maybe a bit closer to ~ 1.2-1.4.  Would consider kidney transplant biopsy for creatinine 1.6 or greater.   - Baseline Creatinine:  ~ 1.2-1.4   - Proteinuria: Normal (<0.2 grams)   - Date DSA Last Checked: Not Known      Latest DSA: Transplanted at outside Transplant Program   - BK Viremia: Not checked recently due to time from transplant   - Kidney Tx Biopsy: 2017; Result: (Per Patient) No diagnostic evidence of acute rejection.     # Pancreas Tx (SPK):    - Pancreatic Exocrine Drainage: Enteric drained     - Blood glucose: Euglycemia      On insulin: No   - HbA1c: Stable      Latest HbA1c: 5.3%   - Pancreatic enzymes: Stable   - Date DSA Last Checked: Not Known  Latest DSA: Transplanted at outside Transplant Program   - Pancreas Tx Biopsy: No    # Immunosuppression: Tacrolimus immediate release (goal 5-8), Mycophenolic acid (dose 540 mg every 12 hours) and Prednisone (dose 5 mg daily)   - Continue with intensive monitoring of immunosuppression for efficacy and toxicity.   - Changes: No    # Infection Prophylaxis:   Last CD4 Level: 349 (Apr/2022)  - PJP: None    # Blood Pressure: Controlled, but low at times;  Goal BP: > 100, but < 130 systolic   - Changes: Not at this time, but if edema becomes an issue, could try midodrine with furosemide to prevent associated hypotension.    # Mineral Bone Disorder:    - Vitamin D; level: Low        On supplement: Yes  - Calcium; level: Normal        On supplement: Yes    # Dyspnea on Exertion: Appears to be stable for a few years now.  Likely due to deconditioning and obesity.   - Recommend working on weight loss for overall health by increasing exercise and watching caloric intake.    # Obesity, Class III (BMI = 41.0): Patient with a continued trend up in weight.   - Recommend weight loss for overall health by increasing exercise and watching caloric intake.    # Recurrent UTIs: No recent episodes and denies any symptoms.    # GERD: Mostly controlled on PPI.    # Diabetic Gastroparesis: Mostly with am nausea, but no vomiting and stable symptoms.    # IBS: No recent issues per patient.    # Peripheral Neuropathy: Stable symptoms on gabapentin.    # Left Ovarian Cyst: Patient with large left ovarian cyst that has caused ongoing pain.  The cyst has been increasing in size.  She is followed closely by Gynecology with discussion of surgery to remove it.  Of note, the cyst seems to sit right on her transplanted kidney.   - Will discuss with Transplant Surgery any concerns for surgery to remove the ovary.    # Skin Cancer Risk:    - Discussed sun protection and recommend regular follow up with Dermatology.    # Medical Compliance: No.  Evidence of labs less than recommended and infrequent transplant follow-up.  - Discussed importance of checking labs regularly as recommended, taking medications as prescribed and attending scheduled medical appointments.    # Health Maintenance and Vaccination Review: Not Reviewed    # Transplant History:  Etiology of Kidney Failure: Diabetes mellitus type 1  Tx: SPK  Transplant: 11/7/2016 (Kidney / Pancreas)  Significant changes in immunosuppression: None  Significant transplant-related complications: Recurrent UTIs    Transplant Office Phone Number: [phone]    Assessment and plan was discussed with the patient and she voiced her understanding  and agreement.    Return visit: Return in about 1 year (around 5/19/2024).    Andres Root MD    Chief Complaint   Ms. Umaña is a 47 year old here for kidney transplant, pancreas transplant and immunosuppression management.    History of Present Illness    Ms. Umaña reports feeling fair overall with several medical complaints.  Since patient's initial visit in 2021, she has had several ongoing issues.  She underwent right carpal tunnel surgery, some foot surgeries, and has been dealing with a very large left ovarian cyst.  Patient is being followed closely by Gynecology for the cyst and has had several pelvic ultrasounds to follow up on it.  There is some discussion about doing surgery to remove it, but patient is concerned as it supposed sits right on her kidney transplant.  In addition, patient has also been dealing with chronic back pain.  She reports continued sciatic pain, right more then left, which really limits her activity.  She cannot even tie her shoes and has to have her  help her as she cannot bend over.  Patient did undergo nerve ablation, but that didn't make a big difference.  With her ongoing medical issues, she has found being a part of online support groups have really helped her mood.    Her energy level seems to wax and wane and mostly stable, but certainly not normal.  She is somewhat active, but really gets minimal exercise, mostly due to her chronic back pain.  Denies any chest pain, but some shortness of breath with exertion, which is unchanged and patient feels this is due to deconditioning.  Occasional leg swelling, but generally controlled if she watches her diet.  Patient will take furosemide as needed, but hasn't had to do so in a month or so.    Appetite is stable, but generally low in the morning and only later in the day does she get hungry and eats more.  She has had a slow trend up in her weight and now almost 270 lbs.  She reports frequent nausea in the morning,  but no vomiting or diarrhea.  Occasional heartburn symptoms and does take omeprazole.  No fever, sweats or chills.  She will get night sweats at times, but has noticed that this generally only happens if she forgets to take her gabapentin before bed.    Home BP: 110/60-80s.  She limits taking furosemide as it can make her lightheaded.    Problem List   Patient Active Problem List   Diagnosis    Chronic kidney disease, stage 3 (H)    Diabetic gastroparesis (H)    Irritable bowel syndrome    Immunosuppressed status (H)    Kidney replaced by transplant    Pancreas replaced by transplant (H)    Diabetes mellitus type 1 (H)    Morbid obesity (H)    Recurrent UTI (urinary tract infection)    Hypothyroidism due to Hashimoto's thyroiditis    GERD (gastroesophageal reflux disease)    Female stress incontinence    Diabetic retinopathy of both eyes (H)    Diabetic peripheral neuropathy (H)    Chronic edema    Chronic diarrhea    Vitamin D deficiency    Aftercare following organ transplant    Dyspnea on exertion    Chronic pain       Allergies   Allergies   Allergen Reactions    Iletin Ii Nph, Hives    Nitrofurantoin Hives, Itching and Nausea    Penicillins Hives    Levothyroxine Other (See Comments)     Patient states hair falls out, and nails break off, and she cannot take this. Only Synthroid.    Contrast Dye Nephrotoxicity and Other (See Comments)       Medications   Current Outpatient Medications   Medication Sig    calcium carbonate 750 MG CHEW Take 600 mg by mouth 2 times daily    cyclobenzaprine (FLEXERIL) 10 MG tablet Take 10 mg by mouth    docusate sodium (DSS) 100 MG capsule Take 100 mg by mouth as needed    estradiol (VAGIFEM) 10 MCG TABS vaginal tablet Place 1 tablet vaginally as needed    furosemide (LASIX) 20 MG tablet Take 20 mg by mouth daily as needed    gabapentin (NEURONTIN) 800 MG tablet Take 800 mg by mouth 2 times daily    levothyroxine (SYNTHROID) 200 MCG tablet Take 200 mcg by mouth daily Brand name  only    lidocaine (XYLOCAINE) 2 % external gel as needed    Multiple Vitamin (MULTIVITAMIN ADULT PO) Take by mouth daily    mycophenolic acid (GENERIC EQUIVALENT) 180 MG EC tablet Take 3 tablets (540 mg) by mouth 2 times daily    omeprazole (PRILOSEC) 20 MG DR capsule Take 20 mg by mouth daily    ondansetron (ZOFRAN) 4 MG tablet Take 4 mg by mouth every 8 hours as needed    predniSONE (DELTASONE) 5 MG tablet Take 5 mg by mouth daily    sulfamethoxazole-trimethoprim (BACTRIM DS) 800-160 MG tablet Take 1 tablet by mouth as needed    tacrolimus (GENERIC EQUIVALENT) 1 MG capsule Total dose = 3 mg in the AM and 2 mg in the PM    traZODone (DESYREL) 100 MG tablet Take 100 mg by mouth At Bedtime    vitamin D3 (CHOLECALCIFEROL) 1.25 MG (63992 UT) capsule Take 50,000 Units by mouth     No current facility-administered medications for this visit.     Medications Discontinued During This Encounter   Medication Reason    Mycophenolate Mofetil (CELLCEPT PO) Alternate therapy       Physical Exam   Vital Signs: /78 (BP Location: Right arm, Patient Position: Sitting, Cuff Size: Adult Large)   Pulse 64   Wt 122.3 kg (269 lb 9.6 oz)   BMI 40.99 kg/m      GENERAL APPEARANCE: alert and no distress  HENT: mouth without ulcers or lesions  LYMPHATICS: no cervical or supraclavicular nodes  RESP: lungs clear to auscultation - no rales, rhonchi or wheezes  CV: regular rhythm, normal rate, no rub, no murmur  EDEMA: no LE edema bilaterally  ABDOMEN: soft, nondistended, nontender, bowel sounds normal, obese  MS: extremities normal - no gross deformities noted, no evidence of inflammation in joints, no muscle tenderness  SKIN: no rash  TX KIDNEY: normal  DIALYSIS ACCESS: none    Data         Latest Ref Rng & Units 3/6/2023    11:04 AM 11/2/2022     1:20 PM 4/18/2022     7:47 AM   Renal   Na (external) 136 - 146 mmol/L 138      140      140        K (external) 3.5 - 5.1 mmol/L 4.5      5.0      4.3        Cl 98 - 107 mmol/L 107      109       107        Cl (external) 98 - 107 mmol/L 107      109      107        CO2 (external) 22 - 29 mmol/L 22      23      27        BUN (external) 10.0 - 20.0 mg/dL 22.6      22.6      18.4        Cr (external) 0.57 - 1.11 mg/dL 1.43      1.19      1.05        Glucose (external) 70 - 100 mg/dL 82      90      81        Ca (external) 8.6 - 10.5 mg/dL 8.9      9.3             This result is from an external source.            Latest Ref Rng & Units 3/6/2023    11:04 AM 11/2/2022     1:20 PM 4/18/2022     7:47 AM   Heme   WBC (external) 3.7 - 12.1 10(3)/uL 5.8      6.3      6.8        Hgb (external) 11.2 - 15.8 g/dL 12.6      12.5      12.4        Plt (external) 179 - 450 10(3)/uL 218      239      291        ABSOLUTE NEUTROPHILS (EXTERNAL) 1.8 - 9.2 10(3)/uL 3.8      4.1      4.3        ABSOLUTE LYMPHOCYTES (EXTERNAL) 1.2 - 3.9 10(3)/uL 1.4      1.6      1.8        ABSOLUTE MONOCYTES (EXTERNAL) 0.0 - 1.1 10(3)/uL 0.5      0.5      0.5        ABSOLUTE EOSINOPHILS (EXTERNAL) 0.0 - 1.0 10(3)/uL 0.1      0.1      0.0        ABSOLUTE BASOPHILS (EXTERNAL) 0.0 - 0.2 10(3)/uL 0.0      0.0      0.0            This result is from an external source.            Latest Ref Rng & Units 3/6/2023    11:04 AM 11/2/2022     1:20 PM 4/18/2022     7:47 AM   Pancreas   A1C (external) <5.7 %  5.2         Amylase (external) 25 - 125 U/L 66      61      52        Lipase (external) 25 - 125 U/L 66      61      52            This result is from an external source.            Latest Ref Rng & Units 4/18/2022     7:47 AM   [facility]   CMV IgG Ext Negative Negative            This result is from an external source.        Recent Labs   Lab Test 11/08/22  0845 03/06/23  1104   DOSTAC 11/7/2022 3/5/2023   TACROL 6.2 7.2         Andres Root MD

## 2023-05-19 NOTE — PATIENT INSTRUCTIONS
Patient Recommendations:  - No new recommendations at this time.    Transplant Patient Information  Your Post Transplant Coordinator is: Joelle Phan  For non urgent items, we encourage you to contact your coordinator/care team online via [employer]  You and your care team can also contact your transplant coordinator Monday - Friday, 8am - 5pm at [phone] (Option 2 to reach the coordinator or Option 4 to schedule an appointment).  After hours for urgent matters, please call [facility] at [phone].

## 2023-05-21 NOTE — PROGRESS NOTES
TRANSPLANT NEPHROLOGY CHRONIC POST TRANSPLANT VISIT    Assessment & Plan   # DDKT (SPK): Variable with recent creatinine elevated, but repeat was back to baseline.  Unclear cause, but most likely related to hemodynamic issues and blood pressure.  Her baseline Cr had been running ~ 1.1-1.3, but over the last year or so, maybe a bit closer to ~ 1.2-1.4.  Would consider kidney transplant biopsy for creatinine 1.6 or greater.   - Baseline Creatinine:  ~ 1.2-1.4   - Proteinuria: Normal (<0.2 grams)   - Date DSA Last Checked: Not Known      Latest DSA: Transplanted at outside Transplant Program   - BK Viremia: Not checked recently due to time from transplant   - Kidney Tx Biopsy: 2017; Result: (Per Patient) No diagnostic evidence of acute rejection.     # Pancreas Tx (SPK):    - Pancreatic Exocrine Drainage: Enteric drained     - Blood glucose: Euglycemia      On insulin: No   - HbA1c: Stable      Latest HbA1c: 5.3%   - Pancreatic enzymes: Stable   - Date DSA Last Checked: Not Known  Latest DSA: Transplanted at outside Transplant Program   - Pancreas Tx Biopsy: No    # Immunosuppression: Tacrolimus immediate release (goal 5-8), Mycophenolic acid (dose 540 mg every 12 hours) and Prednisone (dose 5 mg daily)   - Continue with intensive monitoring of immunosuppression for efficacy and toxicity.   - Changes: No    # Infection Prophylaxis:   Last CD4 Level: 349 (Apr/2022)  - PJP: None    # Blood Pressure: Controlled, but low at times;  Goal BP: > 100, but < 130 systolic   - Changes: Not at this time, but if edema becomes an issue, could try midodrine with furosemide to prevent associated hypotension.    # Mineral Bone Disorder:   - Vitamin D; level: Low        On supplement: Yes  - Calcium; level: Normal        On supplement: Yes    # Dyspnea on Exertion: Appears to be stable for a few years now.  Likely due to deconditioning and obesity.   - Recommend working on weight loss for overall health by increasing exercise and  watching caloric intake.    # Obesity, Class III (BMI = 41.0): Patient with a continued trend up in weight.   - Recommend weight loss for overall health by increasing exercise and watching caloric intake.    # Recurrent UTIs: No recent episodes and denies any symptoms.    # GERD: Mostly controlled on PPI.    # Diabetic Gastroparesis: Mostly with am nausea, but no vomiting and stable symptoms.    # IBS: No recent issues per patient.    # Peripheral Neuropathy: Stable symptoms on gabapentin.    # Left Ovarian Cyst: Patient with large left ovarian cyst that has caused ongoing pain.  The cyst has been increasing in size.  She is followed closely by Gynecology with discussion of surgery to remove it.  Of note, the cyst seems to sit right on her transplanted kidney.   - Will discuss with Transplant Surgery any concerns for surgery to remove the ovary.    # Skin Cancer Risk:    - Discussed sun protection and recommend regular follow up with Dermatology.    # Medical Compliance: No.  Evidence of labs less than recommended and infrequent transplant follow-up.  - Discussed importance of checking labs regularly as recommended, taking medications as prescribed and attending scheduled medical appointments.    # Health Maintenance and Vaccination Review: Not Reviewed    # Transplant History:  Etiology of Kidney Failure: Diabetes mellitus type 1  Tx: SPK  Transplant: 11/7/2016 (Kidney / Pancreas)  Significant changes in immunosuppression: None  Significant transplant-related complications: Recurrent UTIs    Transplant Office Phone Number: [phone]    Assessment and plan was discussed with the patient and she voiced her understanding and agreement.    Return visit: Return in about 1 year (around 5/19/2024).    Andres Root MD    Chief Complaint   Ms. Umaña is a 47 year old here for kidney transplant, pancreas transplant and immunosuppression management.    History of Present Illness    Ms. Umaña reports feeling fair  overall with several medical complaints.  Since patient's initial visit in 2021, she has had several ongoing issues.  She underwent right carpal tunnel surgery, some foot surgeries, and has been dealing with a very large left ovarian cyst.  Patient is being followed closely by Gynecology for the cyst and has had several pelvic ultrasounds to follow up on it.  There is some discussion about doing surgery to remove it, but patient is concerned as it supposed sits right on her kidney transplant.  In addition, patient has also been dealing with chronic back pain.  She reports continued sciatic pain, right more then left, which really limits her activity.  She cannot even tie her shoes and has to have her  help her as she cannot bend over.  Patient did undergo nerve ablation, but that didn't make a big difference.  With her ongoing medical issues, she has found being a part of online support groups have really helped her mood.    Her energy level seems to wax and wane and mostly stable, but certainly not normal.  She is somewhat active, but really gets minimal exercise, mostly due to her chronic back pain.  Denies any chest pain, but some shortness of breath with exertion, which is unchanged and patient feels this is due to deconditioning.  Occasional leg swelling, but generally controlled if she watches her diet.  Patient will take furosemide as needed, but hasn't had to do so in a month or so.    Appetite is stable, but generally low in the morning and only later in the day does she get hungry and eats more.  She has had a slow trend up in her weight and now almost 270 lbs.  She reports frequent nausea in the morning, but no vomiting or diarrhea.  Occasional heartburn symptoms and does take omeprazole.  No fever, sweats or chills.  She will get night sweats at times, but has noticed that this generally only happens if she forgets to take her gabapentin before bed.    Home BP: 110/60-80s.  She limits taking  furosemide as it can make her lightheaded.    Problem List   Patient Active Problem List   Diagnosis     Chronic kidney disease, stage 3 (H)     Diabetic gastroparesis (H)     Irritable bowel syndrome     Immunosuppressed status (H)     Kidney replaced by transplant     Pancreas replaced by transplant (H)     Diabetes mellitus type 1 (H)     Morbid obesity (H)     Recurrent UTI (urinary tract infection)     Hypothyroidism due to Hashimoto's thyroiditis     GERD (gastroesophageal reflux disease)     Female stress incontinence     Diabetic retinopathy of both eyes (H)     Diabetic peripheral neuropathy (H)     Chronic edema     Chronic diarrhea     Vitamin D deficiency     Aftercare following organ transplant     Dyspnea on exertion     Chronic pain       Allergies   Allergies   Allergen Reactions     Iletin Ii Nph, Hives     Nitrofurantoin Hives, Itching and Nausea     Penicillins Hives     Levothyroxine Other (See Comments)     Patient states hair falls out, and nails break off, and she cannot take this. Only Synthroid.     Contrast Dye Nephrotoxicity and Other (See Comments)       Medications   Current Outpatient Medications   Medication Sig     calcium carbonate 750 MG CHEW Take 600 mg by mouth 2 times daily     cyclobenzaprine (FLEXERIL) 10 MG tablet Take 10 mg by mouth     docusate sodium (DSS) 100 MG capsule Take 100 mg by mouth as needed     estradiol (VAGIFEM) 10 MCG TABS vaginal tablet Place 1 tablet vaginally as needed     furosemide (LASIX) 20 MG tablet Take 20 mg by mouth daily as needed     gabapentin (NEURONTIN) 800 MG tablet Take 800 mg by mouth 2 times daily     levothyroxine (SYNTHROID) 200 MCG tablet Take 200 mcg by mouth daily Brand name only     lidocaine (XYLOCAINE) 2 % external gel as needed     Multiple Vitamin (MULTIVITAMIN ADULT PO) Take by mouth daily     mycophenolic acid (GENERIC EQUIVALENT) 180 MG EC tablet Take 3 tablets (540 mg) by mouth 2 times daily     omeprazole (PRILOSEC) 20 MG   capsule Take 20 mg by mouth daily     ondansetron (ZOFRAN) 4 MG tablet Take 4 mg by mouth every 8 hours as needed     predniSONE (DELTASONE) 5 MG tablet Take 5 mg by mouth daily     sulfamethoxazole-trimethoprim (BACTRIM DS) 800-160 MG tablet Take 1 tablet by mouth as needed     tacrolimus (GENERIC EQUIVALENT) 1 MG capsule Total dose = 3 mg in the AM and 2 mg in the PM     traZODone (DESYREL) 100 MG tablet Take 100 mg by mouth At Bedtime     vitamin D3 (CHOLECALCIFEROL) 1.25 MG (25688 UT) capsule Take 50,000 Units by mouth     No current facility-administered medications for this visit.     Medications Discontinued During This Encounter   Medication Reason     Mycophenolate Mofetil (CELLCEPT PO) Alternate therapy       Physical Exam   Vital Signs: /78 (BP Location: Right arm, Patient Position: Sitting, Cuff Size: Adult Large)   Pulse 64   Wt 122.3 kg (269 lb 9.6 oz)   BMI 40.99 kg/m      GENERAL APPEARANCE: alert and no distress  HENT: mouth without ulcers or lesions  LYMPHATICS: no cervical or supraclavicular nodes  RESP: lungs clear to auscultation - no rales, rhonchi or wheezes  CV: regular rhythm, normal rate, no rub, no murmur  EDEMA: no LE edema bilaterally  ABDOMEN: soft, nondistended, nontender, bowel sounds normal, obese  MS: extremities normal - no gross deformities noted, no evidence of inflammation in joints, no muscle tenderness  SKIN: no rash  TX KIDNEY: normal  DIALYSIS ACCESS: none    Data         Latest Ref Rng & Units 3/6/2023    11:04 AM 11/2/2022     1:20 PM 4/18/2022     7:47 AM   Renal   Na (external) 136 - 146 mmol/L 138      140      140        K (external) 3.5 - 5.1 mmol/L 4.5      5.0      4.3        Cl 98 - 107 mmol/L 107      109      107        Cl (external) 98 - 107 mmol/L 107      109      107        CO2 (external) 22 - 29 mmol/L 22      23      27        BUN (external) 10.0 - 20.0 mg/dL 22.6      22.6      18.4        Cr (external) 0.57 - 1.11 mg/dL 1.43      1.19      1.05         Glucose (external) 70 - 100 mg/dL 82      90      81        Ca (external) 8.6 - 10.5 mg/dL 8.9      9.3             This result is from an external source.            Latest Ref Rng & Units 3/6/2023    11:04 AM 11/2/2022     1:20 PM 4/18/2022     7:47 AM   Heme   WBC (external) 3.7 - 12.1 10(3)/uL 5.8      6.3      6.8        Hgb (external) 11.2 - 15.8 g/dL 12.6      12.5      12.4        Plt (external) 179 - 450 10(3)/uL 218      239      291        ABSOLUTE NEUTROPHILS (EXTERNAL) 1.8 - 9.2 10(3)/uL 3.8      4.1      4.3        ABSOLUTE LYMPHOCYTES (EXTERNAL) 1.2 - 3.9 10(3)/uL 1.4      1.6      1.8        ABSOLUTE MONOCYTES (EXTERNAL) 0.0 - 1.1 10(3)/uL 0.5      0.5      0.5        ABSOLUTE EOSINOPHILS (EXTERNAL) 0.0 - 1.0 10(3)/uL 0.1      0.1      0.0        ABSOLUTE BASOPHILS (EXTERNAL) 0.0 - 0.2 10(3)/uL 0.0      0.0      0.0            This result is from an external source.            Latest Ref Rng & Units 3/6/2023    11:04 AM 11/2/2022     1:20 PM 4/18/2022     7:47 AM   Pancreas   A1C (external) <5.7 %  5.2         Amylase (external) 25 - 125 U/L 66      61      52        Lipase (external) 25 - 125 U/L 66      61      52            This result is from an external source.            Latest Ref Rng & Units 4/18/2022     7:47 AM   [facility]   CMV IgG Ext Negative Negative            This result is from an external source.        Recent Labs   Lab Test 11/08/22  0845 03/06/23  1104   DOSTAC 11/7/2022 3/5/2023   TACROL 6.2 7.2

## 2023-05-24 ENCOUNTER — TELEPHONE (OUTPATIENT)
Dept: TRANSPLANT | Facility: CLINIC | Age: 48
End: 2023-05-24
Payer: COMMERCIAL

## 2023-05-24 NOTE — TELEPHONE ENCOUNTER
Provider Call: General  Route to LPN    Reason for call: Pt wanted [facility] to take over her immunosupression meds and changes  Dr Gibbons would not like to make any changes  Wants us to continue with that  Please call pt and review     Call back needed? No if needed call them

## 2023-05-24 NOTE — LETTER
OUTPATIENT LABORATORY TEST ORDER:  After First Year    Patient Name: Lisbeth Umaña                             Transplant Date: 11/7/2016  YOB: 1975                                               Issue Date & Time: 05/26/23 4:03 PM    [facility] MR: [id number]                                                 Exp. Date (1 year after date issued)    Diagnoses:   Kidney Transplant (ICD-10  Z94.0)      Pancreas Transplant (ICD-10  Z94.83)     Long term use of medications (ICD-10  Z79.899)              Lab results to be available on the same day drawn.   Please release results to patient upon their request    Please fax to the Transplant Center at [phone].    Monthly   Complete Blood Count with Platelets    Basic Metabolic Panel (Sodium, Potassium, Chloride, CO2, Creatinine, Urea Nitrogen, Glucose, Calcium)   Amylase, Lipase   /Tacrolimus/Prograf drug level     Every 6 months            Complete Lipid Panel fasting (Cholesterol, Triglycerides, HDL, LDL)            Glycosylated Hemoglobin (A1c)              Urine for Protein/Creatinine      If you have any questions, please call The [facility] at [phone] or [phone].      .                 no

## 2023-05-26 NOTE — TELEPHONE ENCOUNTER
Spoke to pt who states she is worried she fell through the cracks this past year as no one has been monitoring her tacrolimus level. Reassured her we have a tacrolimus level from March, November, and April of 2022 that were in range and we noted that on the results. This made pt feel better. Reviewed she had moved back to [place] 2.5 years ago and has not really felt established here. She has a GP that does not want to manage much and her primary nephrologist is unable to manage her IS and meds/changes.     Reviewed that as long as pt has an annual visit with Dr. Root we can continue to order labs and manage IS. Pt felt reassured with this. Discussed our protocol of monthly labs for pancreas patients and that I will send orders to [facility] lab and copy it to her mychart. She will ensure she is set up for monthly lab appts and will call us with any concerns or questions.

## 2023-05-31 ENCOUNTER — TELEPHONE (OUTPATIENT)
Dept: TRANSPLANT | Facility: CLINIC | Age: 48
End: 2023-05-31
Payer: COMMERCIAL

## 2023-05-31 DIAGNOSIS — Z94.0 KIDNEY TRANSPLANTED: Primary | ICD-10-CM

## 2023-05-31 DIAGNOSIS — N83.209 OVARIAN CYST: ICD-10-CM

## 2023-05-31 NOTE — TELEPHONE ENCOUNTER
----- Message from Cathie Hartman MD sent at 5/30/2023  4:06 PM CDT -----  Regarding: RE: CT  Alexandra Castrejon is great and has worked with our pts before  ----- Message -----  From: Andres Root MD  Sent: 5/30/2023   4:03 PM CDT  To: Joelle Phan, RN; #  Subject: RE: DARIELA Lai,    Can you let the patient know we think it would be safer to have the surgery her at the [facility] and we would be happy to refer her to Gynecology here, if she prefers.    Ultimately, it is the patient's decision.    Dale  ----- Message -----  From: Cathie Hartman MD  Sent: 5/30/2023   3:54 PM CDT  To: Joelle Phan, RN; #  Subject: RE: CT                                           I reviewed it. The cyst is right in the area of the inferior pole of the kidney and ureter on the left. It's difficult to see the ureter on this non-contrast scan but there is potential the ovarian cyst is compressing the ureter. It does look like the Cr has increased over the past several months. I don't see much in the way of hyrdro but CT isn't the best for that and I don't see any renal ultrasounds since 2017 done. I reviewed the op note and as expected the kidney is intraabdominal. I think this makes taking the ovarian cyst out more risky to the kidney and we should probably have it done somewhere a transplant surgeon can be available. Hope that helps  ----- Message -----  From: Joelle Phan, RN  Sent: 5/23/2023  11:48 AM CDT  To: Andres Root MD; #  Subject: RE: CT                                           Katlyn Hartman,     Hmm I guess they did push them yesterday. I spoke to our radiology team and they said the images should be viewable in PACS. Let me know if you still cannot see them. 7/26/21 CT abdomen pelvis.    Dianelys Lai    ----- Message -----  From: Cathie Hartman MD  Sent: 5/23/2023   8:55 AM CDT  To: Joelle Phan RN;  #  Subject: RE: CT                                           I don't think I see them yet??  ----- Message -----  From: Andres Root MD  Sent: 5/21/2023   7:41 AM CDT  To: Joelle Phan RN; #  Subject: CT                                               Joelle,    Can we get the CT scan from 7/26/21 pushed into our system from [employer]  I would like Dr. Hartman to look at it as patient may have ovarian surgery for the left ovary with a very large cyst that is reportedly right over her kidney.  I want to make sure that won't cause an issue with her transplant or see where that surgery should be done.    You can let me know when the images are available.    Dale

## 2023-05-31 NOTE — TELEPHONE ENCOUNTER
Contacted pt with recommendations that she proceed with ovarian cyst removal here at [facility] so that transplant surgeon can be available due to close proximity to her kidney transplant.     Pt agrees with recommendations and would like to proceed. Orders placed for referral.

## 2023-06-06 ENCOUNTER — TELEPHONE (OUTPATIENT)
Dept: OBGYN | Facility: CLINIC | Age: 48
End: 2023-06-06
Payer: COMMERCIAL

## 2023-06-06 NOTE — TELEPHONE ENCOUNTER
Called patient regarding referral:     Ovarian surgery needed for left ovary with a very large cyst near kidney transplant. Reviewed by transplant surgery and they recommend pt have done at [facility] so transplant surgeon can be available for procedure.  Please call to schedule your appointment    Received voicemail and left message going over date and time for soonest available appointment with MD. Offered appointment on 06/29 at 2pm or 3pm with Dr. Canales (on her DOD). Request call back to clinic to confirm these dates and times with any RN.

## 2023-08-01 ENCOUNTER — TELEPHONE (OUTPATIENT)
Dept: TRANSPLANT | Facility: CLINIC | Age: 48
End: 2023-08-01
Payer: COMMERCIAL

## 2023-08-01 DIAGNOSIS — Z94.83 PANCREAS REPLACED BY TRANSPLANT (H): ICD-10-CM

## 2023-08-01 DIAGNOSIS — Z94.0 KIDNEY REPLACED BY TRANSPLANT: ICD-10-CM

## 2023-08-01 DIAGNOSIS — Z94.0 KIDNEY TRANSPLANTED: Primary | ICD-10-CM

## 2023-08-01 RX ORDER — MYCOPHENOLIC ACID 180 MG/1
540 TABLET, DELAYED RELEASE ORAL 2 TIMES DAILY
Qty: 180 TABLET | Refills: 0 | Status: SHIPPED | OUTPATIENT
Start: 2023-08-01 | End: 2023-08-02

## 2023-08-01 NOTE — TELEPHONE ENCOUNTER
Patient Call: General  Route to LPN    Reason for call: drake says she is being denied her tx medication and needs to talk coordinator Joelle REDDY     Call back needed? Yes    Return Call Needed  Same as documented in contacts section  When to return call?: Greater than one day: Route standard priority

## 2023-08-01 NOTE — TELEPHONE ENCOUNTER
"Spoke to pt at length regarding her mycophenolic acid prescription. Informed her we sent one through at 12:23 today for approved refill. Call to pharmacy and verified they received it.     Pt states they have been telling her that they have been trying to get a hold of Dr. Root x1 month for refill and she has been calling all of her providers trying to get it refilled but not one will refill it. Informed pt to please call or MyChart our team if she ever is having trouble getting her IS refilled. Discussed that often the pharmacy will say they \"cannot get a hold of us\" when they are sending electronic requests that are not coming through to us for some reason. Pt appreciated call.     Secondly reminded pt she is overdue for monthly labs. She states she has been getting them done every 6 weeks. Unfortunately I am not seeing these results in our chart or in care everywhere. Confirmed that we sent updated annual lab orders to her lab today and that I also will copy the letter to her in [place] so she can show it at each lab appt.     Pt appreciated call and no further questions.   "

## 2023-08-02 DIAGNOSIS — Z94.0 KIDNEY TRANSPLANTED: Primary | ICD-10-CM

## 2023-08-02 RX ORDER — MYCOPHENOLIC ACID 180 MG/1
540 TABLET, DELAYED RELEASE ORAL 2 TIMES DAILY
Qty: 180 TABLET | Refills: 0 | Status: SHIPPED | OUTPATIENT
Start: 2023-08-02 | End: 2023-10-13

## 2023-08-11 ENCOUNTER — ANCILLARY PROCEDURE (OUTPATIENT)
Dept: ULTRASOUND IMAGING | Facility: CLINIC | Age: 48
End: 2023-08-11
Attending: OBSTETRICS & GYNECOLOGY
Payer: COMMERCIAL

## 2023-08-11 ENCOUNTER — OFFICE VISIT (OUTPATIENT)
Dept: OBGYN | Facility: CLINIC | Age: 48
End: 2023-08-11
Payer: COMMERCIAL

## 2023-08-11 ENCOUNTER — APPOINTMENT (OUTPATIENT)
Dept: LAB | Facility: CLINIC | Age: 48
End: 2023-08-11
Payer: COMMERCIAL

## 2023-08-11 VITALS
HEART RATE: 69 BPM | DIASTOLIC BLOOD PRESSURE: 75 MMHG | WEIGHT: 266.4 LBS | SYSTOLIC BLOOD PRESSURE: 115 MMHG | BODY MASS INDEX: 40.51 KG/M2

## 2023-08-11 DIAGNOSIS — N83.8 MASS OF LEFT OVARY: Primary | ICD-10-CM

## 2023-08-11 DIAGNOSIS — R79.89 OTHER SPECIFIED ABNORMAL FINDINGS OF BLOOD CHEMISTRY: ICD-10-CM

## 2023-08-11 DIAGNOSIS — E28.319 EARLY MENOPAUSE OCCURRING IN PATIENT AGE YOUNGER THAN 45 YEARS: ICD-10-CM

## 2023-08-11 DIAGNOSIS — Z85.07 PERSONAL HISTORY OF MALIGNANT NEOPLASM OF PANCREAS: ICD-10-CM

## 2023-08-11 DIAGNOSIS — N83.8 MASS OF LEFT OVARY: ICD-10-CM

## 2023-08-11 DIAGNOSIS — R97.8 OTHER ABNORMAL TUMOR MARKERS: ICD-10-CM

## 2023-08-11 PROCEDURE — G0463 HOSPITAL OUTPT CLINIC VISIT: HCPCS | Mod: 25

## 2023-08-11 PROCEDURE — 99203 OFFICE O/P NEW LOW 30 MIN: CPT | Mod: GE | Performed by: OBSTETRICS & GYNECOLOGY

## 2023-08-11 PROCEDURE — 76856 US EXAM PELVIC COMPLETE: CPT

## 2023-08-11 PROCEDURE — 99214 OFFICE O/P EST MOD 30 MIN: CPT | Mod: 25

## 2023-08-11 PROCEDURE — 76830 TRANSVAGINAL US NON-OB: CPT | Mod: 26 | Performed by: OBSTETRICS & GYNECOLOGY

## 2023-08-11 PROCEDURE — 76830 TRANSVAGINAL US NON-OB: CPT

## 2023-08-11 PROCEDURE — 76856 US EXAM PELVIC COMPLETE: CPT | Mod: 26 | Performed by: OBSTETRICS & GYNECOLOGY

## 2023-08-11 RX ORDER — LEVOTHYROXINE SODIUM 137 MCG
137 TABLET ORAL
COMMUNITY
Start: 2023-07-25 | End: 2023-10-23

## 2023-08-11 RX ORDER — ONDANSETRON 4 MG/1
TABLET, ORALLY DISINTEGRATING ORAL
COMMUNITY
Start: 2023-05-25

## 2023-08-11 ASSESSMENT — PAIN SCALES - GENERAL: PAINLEVEL: NO PAIN (0)

## 2023-08-11 NOTE — PROGRESS NOTES
SUBJECTIVE   Lisbeth Umaña is a 48 year old , No LMP recorded (exact date). Patient is postmenopausal., here for large left adnexal mass. She has a pertinent medical and surgical history including transplant of kidney and pancrease in  at [facility]. She has recently moved to [facility] and is receiving care at [facility].     She reports that she was found to have a large left ovarian cyst identified on pelvic imaging for routine transplant care. Her primary transplant team referred her to [facility] for surgical planning based on recommendations for removal due to proximity of cyst to grafter kidney and pressures on ureter.     She reports being symptomatic only when bending over and feels like it pushes into her rib cage but otherwise denies symptoms. She is feeling well given her complex medical history.     Her gynecologic history is complicated by early suspected menopause in  around time of transplant. She has not had a period since then, and reports hot flashes. She would like to figure out her surgical plan before considering HRT or management of these symptoms. She also reports vaginal pain and dyspareunia that is severe.     Last pap smear was , NILM HPV negative    She is closely followed for preventative exams and tests.     Past Medical History  Past Medical History:   Diagnosis Date     Anemia in chronic renal disease      Chronic diarrhea      Chronic edema      Chronic kidney disease, stage 3 (H) 2021     Diabetes mellitus type 1 (H)      Diabetic gastroparesis (H)      Diabetic peripheral neuropathy (H)      Diabetic retinopathy of both eyes (H)      End stage renal disease (H)      Female stress incontinence      Fibrocystic breast disease      GERD (gastroesophageal reflux disease)      Hypertension, renal      Hypothyroidism due to Hashimoto's thyroiditis      Immunosuppression (H)      Irritable bowel syndrome      Kidney replaced by transplant      Morbid obesity (H)       Pancreas replaced by transplant (H)      Recurrent UTI (urinary tract infection)      Secondary renal hyperparathyroidism (H)      Seizure due to hypoglycemia (H)      Vitamin D deficiency        Medications  Current Outpatient Medications   Medication     calcium carbonate 750 MG CHEW     cyclobenzaprine (FLEXERIL) 10 MG tablet     docusate sodium (DSS) 100 MG capsule     estradiol (VAGIFEM) 10 MCG TABS vaginal tablet     furosemide (LASIX) 20 MG tablet     lidocaine (XYLOCAINE) 2 % external gel     Multiple Vitamin (MULTIVITAMIN ADULT PO)     mycophenolic acid (GENERIC EQUIVALENT) 180 MG EC tablet     omeprazole (PRILOSEC) 20 MG DR capsule     ondansetron (ZOFRAN ODT) 4 MG ODT tab     ondansetron (ZOFRAN) 4 MG tablet     predniSONE (DELTASONE) 5 MG tablet     sulfamethoxazole-trimethoprim (BACTRIM DS) 800-160 MG tablet     SYNTHROID 137 MCG tablet     tacrolimus (GENERIC EQUIVALENT) 1 MG capsule     traZODone (DESYREL) 100 MG tablet     vitamin D3 (CHOLECALCIFEROL) 1.25 MG (91855 UT) capsule     gabapentin (NEURONTIN) 800 MG tablet     No current facility-administered medications for this visit.       Allergies  Allergies   Allergen Reactions     Iletin Ii Nph, Hives     Nitrofurantoin Hives, Itching and Nausea     Penicillins Hives     Levothyroxine Other (See Comments)     Patient states hair falls out, and nails break off, and she cannot take this. Only Synthroid.     Contrast Dye Nephrotoxicity and Other (See Comments)       Review of Systems   ROS: 10 point ROS neg other than the symptoms noted above in the HPI.    OBJECTIVE   /75   Pulse 69   Wt 120.8 kg (266 lb 6.4 oz)   LMP  (Exact Date)   BMI 40.51 kg/m    General:  Alert, no distress   Head:  Normocephalic, without obvious abnormality   Lungs:  Normal chest rise   Heart:  Regular rate   Abdomen:  Soft, non-tender, non-distended, well healed vertical midline incision with peripheral LINDA drain health incision site       Extremities:  normal      ASSESSMENT   Lisbeth Umaña is a 48 year old  here for surgical consultation regarding large left ovarian cyst in the setting of complex medical and surgical history.     PLAN     #Large simple appearing Left ovarian cyst vs. Peritoneal inclusion cyst  #Hx of major abdominal surgery s/p kidney and pancreas transplant  - Discussed differential which includes benign ovarian versus peritoneal cyst, cannot rule out malignancy. Given simple appearing features on TVUS completed today and low risk factors, I am reassured.  - Discussed that surgery would provide diagnosis and treatment. I explained this mass will likely NOT go away on its own and could keep growing and become more symptomatic.   - Ca125, CEA,  today, TVUS completed today (see report)  - I am recommending laparoscopic USO.  Would keep other ovary for hormone function if it looks normal. Discussed potential for removal of other reproductive organs if needed or thought to be in best interest at the time of the case. Low likely merchant of hysterectomy or URO.  - Discussed surgical risks not limited to bleeding, infection, damage to surrounding organs, need for blood transfusions and ICU stay. Deferred conversations of risk to transplant to her primary team who should follow up.   - She agreed to planning and scheduling of surgery   - case request sent  - will complete preop evaluation  - planning with transplant surgery for case on [place]    #premature menopause  Patient history and symptoms suggestive of menopause, will plan to optimize symptoms post surgery      Charles CAMACHO-PGY2    Patient was seen by the resident in [facility].  I reviewed the history & exam.  The patient's assessment and plan were made jointly.    Kinjal Goodson MD MPH

## 2023-08-12 ENCOUNTER — HEALTH MAINTENANCE LETTER (OUTPATIENT)
Age: 48
End: 2023-08-12

## 2023-10-13 ENCOUNTER — MYC MEDICAL ADVICE (OUTPATIENT)
Dept: TRANSPLANT | Facility: CLINIC | Age: 48
End: 2023-10-13
Payer: COMMERCIAL

## 2023-10-13 DIAGNOSIS — Z94.0 KIDNEY TRANSPLANTED: Primary | ICD-10-CM

## 2023-10-13 RX ORDER — TACROLIMUS 1 MG/1
CAPSULE ORAL
Qty: 150 CAPSULE | Refills: 0 | Status: SHIPPED | OUTPATIENT
Start: 2023-10-13 | End: 2024-03-15

## 2023-10-13 RX ORDER — MYCOPHENOLIC ACID 180 MG/1
540 TABLET, DELAYED RELEASE ORAL 2 TIMES DAILY
Qty: 180 TABLET | Refills: 0 | Status: SHIPPED | OUTPATIENT
Start: 2023-10-13 | End: 2023-11-08

## 2023-10-13 NOTE — TELEPHONE ENCOUNTER
Patient Call: Medication Refill  Route to LPN  Instruct the patient to first contact their pharmacy. If they have called their pharmacy and require further assistance, route to LPN.    Pharmacy Name: [facility]  Pharmacy Location: [address] [place]  Name of Medication: mycophenolic acid Dose: 180 mg tablet                                     Tacrolimus                      1 mg  Patient stated she will be out tomorrow, and would like a follow up call when order placed     When will the patient be out of this medication?: Less than 24 hours (Rashard ISABEL, then page if no answer)

## 2023-10-13 NOTE — LETTER
OUTPATIENT LABORATORY TEST ORDER     Patient Name: Lisbeth Umaña   YOB: 1975     [facility] MR# [if applicable]: [id number]   Date & Time: 10/13/2023  2:05 PM  Expiration Date: 1 year after date issued      Diagnosis: Kidney Transplant (ICD-10 Z94.0)  Pancreas Transplant (ICD-10 Z94.83)    Aftercare following organ transplant (ICD-10 Z48.288)    Long term use of medications (ICD-10 Z79.899)     We ask your assistance in obtaining the following laboratory tests, which are part of our routine surveillance program for Solid Organ Transplant patients.     Please fax each result to [phone], same day as resulted/available    Critical lab results page [phone]  Monday - Friday 8 am to 5 pm  Evening/Weekend/Holiday communicate Critical labs results [phone]    Monthly:  CBC with platelets  Basic Metabolic Panel (Sodium, Potassium, Chloride, CO2, Creatinine, Urea Nitrogen, glucose, Calcium)  Tacrolimus/Prograf/ drug level     Amylase  Lipase    Yearly:        - Urine Protein/Creatinine Ratio       If you have any questions please call the Transplant Center at [phone]. All lab results should be faxed to [phone]    .

## 2023-11-08 DIAGNOSIS — Z94.0 KIDNEY TRANSPLANTED: Primary | ICD-10-CM

## 2023-11-08 RX ORDER — MYCOPHENOLIC ACID 180 MG/1
540 TABLET, DELAYED RELEASE ORAL 2 TIMES DAILY
Qty: 180 TABLET | Refills: 0 | Status: SHIPPED | OUTPATIENT
Start: 2023-11-08 | End: 2023-11-09

## 2023-11-09 DIAGNOSIS — Z94.0 KIDNEY TRANSPLANTED: ICD-10-CM

## 2023-11-09 RX ORDER — MYCOPHENOLIC ACID 180 MG/1
540 TABLET, DELAYED RELEASE ORAL 2 TIMES DAILY
Qty: 180 TABLET | Refills: 0 | Status: SHIPPED | OUTPATIENT
Start: 2023-11-09 | End: 2023-12-06

## 2023-11-09 NOTE — TELEPHONE ENCOUNTER
Patient Call: Medication Refill  Route to LPN  Instruct the patient to first contact their pharmacy. If they have called their pharmacy and require further assistance, route to LPN.    Pharmacy Name: [id number]    Pharmacy Location: [place], [place] - [address] AT [address] [place] [address] & [facility]       Name of Medication: mycophenolic acid (GENERIC EQUIVALENT)       Dose: 180 MG EC tablet        When will the patient be out of this medication?: Less than 24 hours (Rashard ISABEL, then page if no answer)

## 2023-12-05 DIAGNOSIS — Z94.0 KIDNEY TRANSPLANTED: Primary | ICD-10-CM

## 2023-12-06 RX ORDER — MYCOPHENOLIC ACID 180 MG/1
540 TABLET, DELAYED RELEASE ORAL 2 TIMES DAILY
Qty: 180 TABLET | Refills: 3 | Status: SHIPPED | OUTPATIENT
Start: 2023-12-06 | End: 2024-03-05

## 2023-12-30 ENCOUNTER — HEALTH MAINTENANCE LETTER (OUTPATIENT)
Age: 48
End: 2023-12-30

## 2024-03-05 ENCOUNTER — TELEPHONE (OUTPATIENT)
Dept: TRANSPLANT | Facility: CLINIC | Age: 49
End: 2024-03-05
Payer: MEDICARE

## 2024-03-05 DIAGNOSIS — Z94.0 KIDNEY TRANSPLANTED: Primary | ICD-10-CM

## 2024-03-05 DIAGNOSIS — Z94.83 PANCREAS REPLACED BY TRANSPLANT (H): ICD-10-CM

## 2024-03-05 RX ORDER — MYCOPHENOLIC ACID 180 MG/1
540 TABLET, DELAYED RELEASE ORAL 2 TIMES DAILY
Qty: 180 TABLET | Refills: 0 | Status: SHIPPED | OUTPATIENT
Start: 2024-03-05 | End: 2024-03-06

## 2024-03-05 NOTE — TELEPHONE ENCOUNTER
General  Route to LPN    Reason for call: Lisbeth was calling in because she tried to have [facility] fill her mycophenolate and they said medicare is not covering it and she needs to get it filled by a specialty pharmacy. She was able to get 12 from them but she will be completely out after tomorrow. She is wondering if this is happening to just her or if anyone else is having the same issue     Call back needed? Yes    Return Call Needed  Same as documented in contacts section  When to return call?: Same day: Route High Priority

## 2024-03-05 NOTE — TELEPHONE ENCOUNTER
Pts primary insurance is medicare part B confirmed by Shruti Lee.     Medicare portal is still down.    Script sent to [facility] mail order and pt will call to get a shipment so she does not run out of the medication. They may not be able to get the deliver over to her until later this week. Pt needs to go to [facility] to discuss with them how to get a few doses bridged until her delivery set up.     Pt appreciated call and will call back with any issues.

## 2024-03-05 NOTE — TELEPHONE ENCOUNTER
Return call to pt who is having issues filling her Myfortic. Conference call to her [facility] pharmacy to discuss with them. The issue is her insurance changed to [employer] for her drugs which is her primary drug coverage. She was provided [facility] [employer] phone number and told she needs to contact them to discuss this prescription and how quickly she can get it filled.     Informed Lisbeth to please obtain an address or fax number after for me to send the prescription to the correct [facility] pharmacy. Pt will also need to discuss with them about how quickly she could get this medication as she will be out after tomorrow. She will ask if she can get it filled at a local [facility] or [facility].  Alternatively, Nasir the pharmacist at her local [facility] said to call him back if any issues filling her medication and he will find a away to get her the Myfortic to bridge her until delivery.     Lisbeth will call me back with updates.

## 2024-03-06 RX ORDER — MYCOPHENOLIC ACID 180 MG/1
540 TABLET, DELAYED RELEASE ORAL 2 TIMES DAILY
Qty: 60 TABLET | Refills: 0 | Status: SHIPPED | OUTPATIENT
Start: 2024-03-06 | End: 2024-03-07

## 2024-03-06 RX ORDER — MYCOPHENOLIC ACID 180 MG/1
540 TABLET, DELAYED RELEASE ORAL 2 TIMES DAILY
Qty: 180 TABLET | Refills: 0 | Status: SHIPPED | OUTPATIENT
Start: 2024-03-06 | End: 2024-03-06

## 2024-03-06 NOTE — TELEPHONE ENCOUNTER
After extensive collaboration with Shruti Lee and patient, she is picking up a 10 day supply of MPA at [facility] using Good rx coupon. Found out her primary prescription insurance IS  in fact [facility] [employer] and NOT medicare so she can only fill through [facility] specialty pharmacy for her IS medications.     She will call back once [facility] prescription is picked up so I can send her ongoing prescription for MPA and tacrolimus to [facility] in  [place], [place]. This pharmacy starred/marked as her pharmacy in chart.

## 2024-03-07 DIAGNOSIS — Z94.0 KIDNEY TRANSPLANTED: Primary | ICD-10-CM

## 2024-03-07 RX ORDER — MYCOPHENOLIC ACID 180 MG/1
540 TABLET, DELAYED RELEASE ORAL 2 TIMES DAILY
Qty: 180 TABLET | Refills: 3 | Status: SHIPPED | OUTPATIENT
Start: 2024-03-07 | End: 2024-06-04

## 2024-03-07 NOTE — TELEPHONE ENCOUNTER
Pharmacy called needing a new script on the mycophenolic acid (GENERIC EQUIVALENT) 180 MG EC tablet.

## 2024-03-07 NOTE — LETTER
OUTPATIENT LABORATORY TEST ORDER     Patient Name: Lisbeth Umaña   YOB: 1975     [facility] MR# [if applicable]: [id number]   Date & Time: 3/7/2024  4:18 PM  Expiration Date: 1 year after date issued      Diagnosis: Kidney Transplant (ICD-10 Z94.0)  Pancreas Transplant (ICD-10 Z94.83)    Aftercare following organ transplant (ICD-10 Z48.288)    Long term use of medications (ICD-10 Z79.899)     We ask your assistance in obtaining the following laboratory tests, which are part of our routine surveillance program for Solid Organ Transplant patients.     Please fax each result to [phone], same day as resulted/available    Critical lab results page [phone]  Monday - Friday 8 am to 5 pm  Evening/Weekend/Holiday communicate Critical labs results [phone]    Monthly:  CBC with platelets  Basic Metabolic Panel (Sodium, Potassium, Chloride, CO2, Creatinine, Urea Nitrogen, glucose, Calcium)  Tacrolimus/Prograf/ drug level     Amylase  Lipase    Yearly:        - Urine Protein/Creatinine Ratio       If you have any questions please call the Transplant Center at [phone]. All lab results should be faxed to [phone]    .

## 2024-03-08 NOTE — TELEPHONE ENCOUNTER
Per chart review [facility] pharmacy called for script. Script has been sent to specialty pharmacy 3/7/24

## 2024-03-15 DIAGNOSIS — Z94.0 KIDNEY TRANSPLANTED: Primary | ICD-10-CM

## 2024-03-15 RX ORDER — TACROLIMUS 1 MG/1
CAPSULE ORAL
Qty: 150 CAPSULE | Refills: 0 | Status: SHIPPED | OUTPATIENT
Start: 2024-03-15 | End: 2024-04-08

## 2024-04-08 DIAGNOSIS — Z94.0 KIDNEY TRANSPLANTED: Primary | ICD-10-CM

## 2024-04-08 RX ORDER — TACROLIMUS 1 MG/1
CAPSULE ORAL
Qty: 150 CAPSULE | Refills: 0 | Status: SHIPPED | OUTPATIENT
Start: 2024-04-08 | End: 2024-05-02

## 2024-05-02 DIAGNOSIS — Z94.0 KIDNEY TRANSPLANTED: Primary | ICD-10-CM

## 2024-05-02 RX ORDER — TACROLIMUS 1 MG/1
CAPSULE ORAL
Qty: 150 CAPSULE | Refills: 0 | Status: SHIPPED | OUTPATIENT
Start: 2024-05-02 | End: 2024-05-30

## 2024-05-18 ENCOUNTER — HEALTH MAINTENANCE LETTER (OUTPATIENT)
Age: 49
End: 2024-05-18

## 2024-05-30 DIAGNOSIS — Z94.0 KIDNEY TRANSPLANTED: Primary | ICD-10-CM

## 2024-05-30 RX ORDER — TACROLIMUS 1 MG/1
CAPSULE ORAL
Qty: 150 CAPSULE | Refills: 0 | Status: SHIPPED | OUTPATIENT
Start: 2024-05-30 | End: 2024-06-28

## 2024-06-03 DIAGNOSIS — Z94.0 KIDNEY TRANSPLANTED: ICD-10-CM

## 2024-06-04 RX ORDER — MYCOPHENOLIC ACID 180 MG/1
540 TABLET, DELAYED RELEASE ORAL 2 TIMES DAILY
Qty: 180 TABLET | Refills: 2 | Status: SHIPPED | OUTPATIENT
Start: 2024-06-04 | End: 2024-08-21

## 2024-06-27 ENCOUNTER — LAB (OUTPATIENT)
Dept: LAB | Facility: CLINIC | Age: 49
End: 2024-06-27
Payer: COMMERCIAL

## 2024-06-27 DIAGNOSIS — Z94.0 KIDNEY TRANSPLANTED: ICD-10-CM

## 2024-06-27 DIAGNOSIS — Z94.0 KIDNEY REPLACED BY TRANSPLANT: ICD-10-CM

## 2024-06-27 DIAGNOSIS — Z94.83 PANCREAS REPLACED BY TRANSPLANT (H): ICD-10-CM

## 2024-06-27 LAB
TACROLIMUS BLD-MCNC: 6.5 UG/L (ref 5–15)
TME LAST DOSE: NORMAL H
TME LAST DOSE: NORMAL H

## 2024-06-27 PROCEDURE — 80197 ASSAY OF TACROLIMUS: CPT

## 2024-06-28 DIAGNOSIS — Z94.0 KIDNEY TRANSPLANTED: Primary | ICD-10-CM

## 2024-06-28 RX ORDER — TACROLIMUS 1 MG/1
CAPSULE ORAL
Qty: 150 CAPSULE | Refills: 11 | Status: SHIPPED | OUTPATIENT
Start: 2024-06-28

## 2024-07-22 PROCEDURE — 80197 ASSAY OF TACROLIMUS: CPT | Performed by: INTERNAL MEDICINE

## 2024-07-23 DIAGNOSIS — Z94.83 PANCREAS REPLACED BY TRANSPLANT (H): ICD-10-CM

## 2024-07-23 DIAGNOSIS — Z94.0 KIDNEY REPLACED BY TRANSPLANT: ICD-10-CM

## 2024-07-23 DIAGNOSIS — Z94.0 KIDNEY TRANSPLANTED: ICD-10-CM

## 2024-07-23 LAB
TACROLIMUS BLD-MCNC: 9.1 UG/L (ref 5–15)
TME LAST DOSE: NORMAL H
TME LAST DOSE: NORMAL H

## 2024-07-25 NOTE — PROGRESS NOTES
TRANSPLANT NEPHROLOGY CLINIC VISIT     Assessment & Plan   # DDKT (SPK): CKD Stage 3a - Stable   - Baseline Creatinine: ~ 1.2-1.4   - Proteinuria: Normal (<0.2 grams)   - DSA Hx:  Unknown as transplanted at outside center    - Last cPRA: ??%   - BK Viremia: Not checked recently due to time from transplant   - Kidney Tx Biopsy Hx: No history of acute rejection.    # Pancreas Tx (SPK):    - Pancreatic Exocrine Drainage: Enteric drained     - Blood glucose: Euglycemia      On insulin: No   - HbA1c: Stable      Latest HbA1c: 5.1%   - Pancreatic enzymes: Stable   - DSA Hx:  Unknown as transplanted at outside center   - Pancreas Tx Biopsy Hx: No biopsy history    # Immunosuppression: Tacrolimus immediate release (goal 5-8), Mycophenolic acid (dose 540 mg every 12 hours), and Prednisone (dose 5 mg daily)   - Induction with Recent Transplant:  Not known due to time from transplant   - Continue with intensive monitoring of immunosuppression for efficacy and toxicity.   - Historical Changes in Immunosuppression: None   - Changes: No    # Infection Prevention:      Last CD4 Level: 349 (Apr/2022)  - PJP: None  - CMV IgG Ab High Risk Discordance (D+/R-): Yes  - EBV IgG Ab High Risk Discordance (D+/R-): No    # Blood Pressure: Controlled;  Goal BP: > 100, but < 130 systolic   - Changes: No    # Mineral Bone Disorder:    - Vitamin D; level: Normal        On supplement: No  - Calcium; level: Normal        On supplement: Yes    # Electrolytes:   - Potassium; level: Normal        On supplement: No  - Bicarbonate; level: Stable low        On supplement: No    # Obesity, Class III (BMI = 40.9): Stable weight.   - Recommend weight loss for overall health by increasing exercise and watching caloric intake.   - Patient may benefit from SGLT2 inhibitors.  However, due to pancreas transplant, would avoid GLP1 agonists due to increased risk of pancreatitis.    # Other Significant PMH:   - Dyspnea on Exertion: Appears to be stable for a few  years now.  Likely due to deconditioning and obesity.  - Recurrent UTIs: No recent episodes and denies any symptoms.  - GERD: Asymptomatic on PPI.    - Recommend tapering off omeprazole by starting to take it every other day.  If tolerated without significant symptoms, patient can stop omeprazole and just take it or OTC famotidine (Pepcid) as needed for heartburn symptoms.  - Diabetic Gastroparesis: Mostly with am nausea, but rare vomiting and stable symptoms.  - IBS: No recent issues per patient.  - Peripheral Neuropathy: Stable symptoms, mostly in her toes, on gabapentin.  - Left Ovarian Cyst: Patient with large left ovarian cyst that has caused ongoing pain.  The cyst has been increasing in size.  She was seen by Gynecology with discussion of surgery to remove it, but somehow the surgery was never done.   - Will message Gynecology to see if this can be readdressed.     # Skin Cancer Risk:    - Discussed sun protection and recommend regular follow up with Dermatology.    # Transplant History:  Etiology of Kidney Failure: Diabetes mellitus type 1  Tx: SPK  Transplant: 11/7/2016 (Kidney / Pancreas)  Significant transplant-related complications: None    Transplant Office Phone Number: [phone]    Assessment and plan was discussed with the patient and she voiced her understanding and agreement.    Return visit: Return in about 1 year (around 7/26/2025).    Andres Root MD    The longitudinal plan of care for the diagnosis(es)/condition(s) as documented were addressed during this visit. Due to the added complexity in care, I will continue to support Lisbeth in the subsequent management and with ongoing continuity of care.      Chief Complaint   Ms. Umaña is a 49 year old here for kidney transplant, pancreas transplant, and immunosuppression management.     History of Present Illness    Ms. Umaña reports feeling good overall.  Since last clinic visit:   Hospitalizations: Yes; One episode last year for  nausea and vomiting.   New Medical Issues: No  Chest pain or shortness of breath: Yes; Occasional chest pain issues with anxiety.  Stable shortness of breath with exertion.  Lower extremity swelling: Yes; Intermittent episodes and takes furosemide as needed.  Weight change: No  Nausea and vomiting: Yes; Ongoing gastroparesis with stable, occasional nausea about 2x per week and rare vomiting episodes.  Diarrhea: No; Symptoms much better with watching diet and increased dietary fiber.  Heartburn symptoms: No; Controlled on omeprazole.  Fever, sweats or chills: No  Urinary complaints: No    Home BP:  110/60s    Problem List   Patient Active Problem List   Diagnosis    Stage 3a chronic kidney disease (H)    Diabetic gastroparesis (H)    Irritable bowel syndrome    Immunosuppressed status (H24)    Kidney replaced by transplant    Pancreas replaced by transplant (H)    Diabetes mellitus type 1 (H)    Morbid obesity (H)    Recurrent UTI (urinary tract infection)    Hypothyroidism due to Hashimoto's thyroiditis    GERD (gastroesophageal reflux disease)    Female stress incontinence    Diabetic retinopathy of both eyes (H)    Diabetic peripheral neuropathy (H)    Chronic edema    Chronic diarrhea    Vitamin D deficiency    Aftercare following organ transplant    Dyspnea on exertion    Chronic pain       Allergies   Allergies   Allergen Reactions    Iletin Ii Nph, Hives    Nitrofurantoin Hives, Itching and Nausea    Penicillins Hives    Levothyroxine Other (See Comments)     Patient states hair falls out, and nails break off, and she cannot take this. Only Synthroid.    Contrast Dye Nephrotoxicity and Other (See Comments)       Medications   Current Outpatient Medications   Medication Sig Dispense Refill    calcium carbonate 750 MG CHEW Take 600 mg by mouth 2 times daily      cyclobenzaprine (FLEXERIL) 10 MG tablet Take 10 mg by mouth      D3-50 1.25 MG (83538 UT) capsule Take 1 capsule by mouth once a week      docusate  sodium (DSS) 100 MG capsule Take 100 mg by mouth as needed      furosemide (LASIX) 20 MG tablet Take 20 mg by mouth daily as needed      gabapentin (NEURONTIN) 800 MG tablet Take 800 mg by mouth 2 times daily      lidocaine (XYLOCAINE) 2 % external gel as needed      Multiple Vitamin (MULTIVITAMIN ADULT PO) Take by mouth daily      mycophenolic acid (GENERIC EQUIVALENT) 180 MG EC tablet TAKE 3 TABLETS (540 MG) BY MOUTH 2 TIMES DAILY 180 tablet 2    omeprazole (PRILOSEC) 20 MG DR capsule Take 20 mg by mouth daily      ondansetron (ZOFRAN ODT) 4 MG ODT tab       ondansetron (ZOFRAN) 4 MG tablet Take 4 mg by mouth every 8 hours as needed      predniSONE (DELTASONE) 5 MG tablet Take 5 mg by mouth daily      sulfamethoxazole-trimethoprim (BACTRIM DS) 800-160 MG tablet Take 1 tablet by mouth as needed      SYNTHROID 137 MCG tablet Take 137 mcg by mouth daily      tacrolimus (GENERIC EQUIVALENT) 1 MG capsule Take 3 capsules (3 mg) by mouth every morning AND 2 capsules (2 mg) every evening. 150 capsule 11    traZODone (DESYREL) 100 MG tablet Take 100 mg by mouth At Bedtime       No current facility-administered medications for this visit.     Medications Discontinued During This Encounter   Medication Reason    estradiol (VAGIFEM) 10 MCG TABS vaginal tablet Therapy completed (No AVS)       Physical Exam   Vital Signs: /74 (BP Location: Right arm, Patient Position: Sitting, Cuff Size: Adult Large)   Pulse 64   Wt 122.1 kg (269 lb 3.2 oz)   BMI 40.93 kg/m      GENERAL APPEARANCE: alert and no distress  HENT: mouth without ulcers or lesions  RESP: lungs clear to auscultation - no rales, rhonchi or wheezes  CV: regular rhythm, normal rate, no rub, no murmur  EDEMA: no LE edema bilaterally  ABDOMEN: soft, nondistended, nontender, bowel sounds normal, obese  MS: extremities normal - no gross deformities noted, no evidence of inflammation in joints, no muscle tenderness  SKIN: no rash  TX KIDNEY: normal  DIALYSIS ACCESS:   LUE AV fistula with NO thrill    Data         Latest Ref Rng & Units 7/22/2024     9:59 AM 6/27/2024    10:44 AM 11/14/2023     8:52 AM   Renal   Na (external) 136 - 146 mmol/L 140  139  141       K (external) 3.5 - 5.1 mmol/L 4.4  4.1  4.5       Cl 98 - 107 mmol/L 111  107  108       Cl (external) 98 - 107 mmol/L 111  107  108       CO2 (external) 22 - 29 mmol/L 21  24  20       BUN (external) 10.0 - 20.0 mg/dL 18.6  23.7  15.3       Cr (external) 0.57 - 1.11 mg/dL 1.29  1.36  1.29       Glucose (external) 70 - 100 mg/dL 85  84  87       Ca (external) 8.6 - 10.5 mg/dL 9.1  9.2  9.0       Mg (external) 1.8 - 2.6 mg/dL   1.5           This result is from an external source.         Latest Ref Rng & Units 11/14/2023     8:52 AM   [facility]   Phos (external) 2.9 - 5.2 mg/dL 3.5       PTHi (external) 11.0 - 101.0 pg/mL 118.7       Vit D Def (external) 30.0 - 100.0 ng/mL 49.2           This result is from an external source.         Latest Ref Rng & Units 7/22/2024     9:59 AM 6/27/2024    10:44 AM 11/14/2023     8:52 AM   Heme   WBC (external) 3.7 - 12.1 10(3)/uL 8.4  9.1     5.2       Hgb (external) 11.2 - 15.8 g/dL 13.6  13.8     13.2       Plt (external) 179 - 450 10(3)/uL 254  230     227       ABSOLUTE NEUTROPHILS (EXTERNAL) 1.8 - 9.2 10(3)/uL 5.1     5.6     2.8       ABSOLUTE LYMPHOCYTES (EXTERNAL) 1.2 - 3.9 10(3)/uL 2.5     2.6     1.9       ABSOLUTE MONOCYTES (EXTERNAL) 0.0 - 1.1 10(3)/uL 0.7     0.7     0.5       ABSOLUTE EOSINOPHILS (EXTERNAL) 0.0 - 0.8 10(3)/uL 0.1     0.1     0.1       ABSOLUTE BASOPHILS (EXTERNAL) 0.0 - 0.2 10(3)/uL 0.0     0.0     0.0           This result is from an external source.         Latest Ref Rng & Units 11/14/2023     8:52 AM   Liver   AP (external) 50 - 136 U/L 72       TBili (external) 0.2 - 1.2 mg/dL 0.5       DBili (external) 0.0 - 0.5 mg/dL 0.2       ALT (external) 8 - 45 U/L 10       AST (external) 5 - 41 U/L 15       Tot Protein (external) 6.0 - 8.0 g/dL 6.6        Albumin (external) 3.5 - 5.0 g/dL 3.6           This result is from an external source.         Latest Ref Rng & Units 7/22/2024     9:59 AM 6/27/2024    10:44 AM 11/14/2023     8:52 AM   Pancreas   Amylase (external) 25 - 125 U/L 60  59  51       Lipase (external) 25 - 125 U/L 60  59  51           This result is from an external source.            Latest Ref Rng & Units 4/18/2022     7:47 AM   [facility]   CMV IgG Ext Negative Negative           This result is from an external source.     Failed to redirect to the Timeline version of the REVFS SmartLink.  Recent Labs   Lab Test 03/06/23  1104 06/27/24  1040 07/22/24  0956   DOSTAC 3/5/2023 6/26/2024 7/21/2024   TACROL 7.2 6.5 9.1

## 2024-07-26 ENCOUNTER — OFFICE VISIT (OUTPATIENT)
Dept: NEPHROLOGY | Facility: CLINIC | Age: 49
End: 2024-07-26
Attending: INTERNAL MEDICINE
Payer: COMMERCIAL

## 2024-07-26 VITALS
DIASTOLIC BLOOD PRESSURE: 74 MMHG | SYSTOLIC BLOOD PRESSURE: 116 MMHG | WEIGHT: 269.2 LBS | HEART RATE: 64 BPM | BODY MASS INDEX: 40.93 KG/M2

## 2024-07-26 DIAGNOSIS — Z94.83 PANCREAS REPLACED BY TRANSPLANT (H): ICD-10-CM

## 2024-07-26 DIAGNOSIS — Z94.0 KIDNEY TRANSPLANTED: ICD-10-CM

## 2024-07-26 DIAGNOSIS — Z94.0 KIDNEY REPLACED BY TRANSPLANT: Primary | ICD-10-CM

## 2024-07-26 DIAGNOSIS — N18.31 STAGE 3A CHRONIC KIDNEY DISEASE (H): ICD-10-CM

## 2024-07-26 DIAGNOSIS — Z48.298 AFTERCARE FOLLOWING ORGAN TRANSPLANT: ICD-10-CM

## 2024-07-26 DIAGNOSIS — D84.9 IMMUNOSUPPRESSED STATUS (H): ICD-10-CM

## 2024-07-26 DIAGNOSIS — E55.9 VITAMIN D DEFICIENCY: ICD-10-CM

## 2024-07-26 PROCEDURE — 99214 OFFICE O/P EST MOD 30 MIN: CPT | Performed by: INTERNAL MEDICINE

## 2024-07-26 PROCEDURE — G2211 COMPLEX E/M VISIT ADD ON: HCPCS | Performed by: INTERNAL MEDICINE

## 2024-07-26 RX ORDER — METHOCARBAMOL 750 MG/1
1 TABLET ORAL WEEKLY
COMMUNITY
Start: 2024-05-07

## 2024-07-26 RX ORDER — LEVOTHYROXINE SODIUM 137 MCG
137 TABLET ORAL DAILY
COMMUNITY
Start: 2023-12-11 | End: 2024-12-10

## 2024-07-26 NOTE — PATIENT INSTRUCTIONS
Patient Recommendations:  - Recommend tapering off omeprazole by starting to take it every other day.  If tolerated without significant symptoms, patient can stop omeprazole and just take it or over-the-counter famotidine (Pepcid) as needed for heartburn symptoms.  - Recommend weight loss for overall health by increasing exercise and watching caloric intake.  - Recommend checking routine transplant labs every 1-2 months.   - Patient may benefit from SGLT2 inhibitors.  However, due to pancreas transplant, would avoid GLP1 agonists due to increased risk of pancreatitis.     Transplant Patient Information  Your Post Transplant Coordinator is: Joelle Phan  For non urgent items, we encourage you to contact your coordinator/care team online via [employer]  You and your care team can also contact your transplant coordinator Monday - Friday, 8am - 5pm at [phone] (Option 2 to reach the coordinator or Option 4 to schedule an appointment).  After hours for urgent matters, please call [facility] at [phone].

## 2024-07-26 NOTE — LETTER
7/26/2024      RE: Lisbeth Umaña  [address] [place] [address]       TRANSPLANT NEPHROLOGY CLINIC VISIT     Assessment & Plan  # DDKT (SPK): CKD Stage 3a - Stable   - Baseline Creatinine: ~ 1.2-1.4   - Proteinuria: Normal (<0.2 grams)   - DSA Hx:  Unknown as transplanted at outside center    - Last cPRA: ??%   - BK Viremia: Not checked recently due to time from transplant   - Kidney Tx Biopsy Hx: No history of acute rejection.    # Pancreas Tx (SPK):    - Pancreatic Exocrine Drainage: Enteric drained     - Blood glucose: Euglycemia      On insulin: No   - HbA1c: Stable      Latest HbA1c: 5.1%   - Pancreatic enzymes: Stable   - DSA Hx:  Unknown as transplanted at outside center   - Pancreas Tx Biopsy Hx: No biopsy history    # Immunosuppression: Tacrolimus immediate release (goal 5-8), Mycophenolic acid (dose 540 mg every 12 hours), and Prednisone (dose 5 mg daily)   - Induction with Recent Transplant:  Not known due to time from transplant   - Continue with intensive monitoring of immunosuppression for efficacy and toxicity.   - Historical Changes in Immunosuppression: None   - Changes: No    # Infection Prevention:      Last CD4 Level: 349 (Apr/2022)  - PJP: None  - CMV IgG Ab High Risk Discordance (D+/R-): Yes  - EBV IgG Ab High Risk Discordance (D+/R-): No    # Blood Pressure: Controlled;  Goal BP: > 100, but < 130 systolic   - Changes: No    # Mineral Bone Disorder:    - Vitamin D; level: Normal        On supplement: No  - Calcium; level: Normal        On supplement: Yes    # Electrolytes:   - Potassium; level: Normal        On supplement: No  - Bicarbonate; level: Stable low        On supplement: No    # Obesity, Class III (BMI = 40.9): Stable weight.   - Recommend weight loss for overall health by increasing exercise and watching caloric intake.   - Patient may benefit from SGLT2 inhibitors.  However, due to pancreas transplant, would avoid GLP1 agonists due to increased risk of pancreatitis.    #  Other Significant PMH:   - Dyspnea on Exertion: Appears to be stable for a few years now.  Likely due to deconditioning and obesity.  - Recurrent UTIs: No recent episodes and denies any symptoms.  - GERD: Asymptomatic on PPI.    - Recommend tapering off omeprazole by starting to take it every other day.  If tolerated without significant symptoms, patient can stop omeprazole and just take it or OTC famotidine (Pepcid) as needed for heartburn symptoms.  - Diabetic Gastroparesis: Mostly with am nausea, but rare vomiting and stable symptoms.  - IBS: No recent issues per patient.  - Peripheral Neuropathy: Stable symptoms, mostly in her toes, on gabapentin.  - Left Ovarian Cyst: Patient with large left ovarian cyst that has caused ongoing pain.  The cyst has been increasing in size.  She was seen by Gynecology with discussion of surgery to remove it, but somehow the surgery was never done.   - Will message Gynecology to see if this can be readdressed.     # Skin Cancer Risk:    - Discussed sun protection and recommend regular follow up with Dermatology.    # Transplant History:  Etiology of Kidney Failure: Diabetes mellitus type 1  Tx: SPK  Transplant: 11/7/2016 (Kidney / Pancreas)  Significant transplant-related complications: None    Transplant Office Phone Number: [phone]    Assessment and plan was discussed with the patient and she voiced her understanding and agreement.    Return visit: Return in about 1 year (around 7/26/2025).    Andres Root MD    The longitudinal plan of care for the diagnosis(es)/condition(s) as documented were addressed during this visit. Due to the added complexity in care, I will continue to support Lisbeth in the subsequent management and with ongoing continuity of care.      Chief Complaint  Ms. Umaña is a 49 year old here for kidney transplant, pancreas transplant, and immunosuppression management.     History of Present Illness   Ms. Umaña reports feeling good  overall.  Since last clinic visit:   Hospitalizations: Yes; One episode last year for nausea and vomiting.   New Medical Issues: No  Chest pain or shortness of breath: Yes; Occasional chest pain issues with anxiety.  Stable shortness of breath with exertion.  Lower extremity swelling: Yes; Intermittent episodes and takes furosemide as needed.  Weight change: No  Nausea and vomiting: Yes; Ongoing gastroparesis with stable, occasional nausea about 2x per week and rare vomiting episodes.  Diarrhea: No; Symptoms much better with watching diet and increased dietary fiber.  Heartburn symptoms: No; Controlled on omeprazole.  Fever, sweats or chills: No  Urinary complaints: No    Home BP:  110/60s    Problem List  Patient Active Problem List   Diagnosis     Stage 3a chronic kidney disease (H)     Diabetic gastroparesis (H)     Irritable bowel syndrome     Immunosuppressed status (H24)     Kidney replaced by transplant     Pancreas replaced by transplant (H)     Diabetes mellitus type 1 (H)     Morbid obesity (H)     Recurrent UTI (urinary tract infection)     Hypothyroidism due to Hashimoto's thyroiditis     GERD (gastroesophageal reflux disease)     Female stress incontinence     Diabetic retinopathy of both eyes (H)     Diabetic peripheral neuropathy (H)     Chronic edema     Chronic diarrhea     Vitamin D deficiency     Aftercare following organ transplant     Dyspnea on exertion     Chronic pain       Allergies  Allergies   Allergen Reactions     Iletin Ii Nph, Hives     Nitrofurantoin Hives, Itching and Nausea     Penicillins Hives     Levothyroxine Other (See Comments)     Patient states hair falls out, and nails break off, and she cannot take this. Only Synthroid.     Contrast Dye Nephrotoxicity and Other (See Comments)       Medications  Current Outpatient Medications   Medication Sig Dispense Refill     calcium carbonate 750 MG CHEW Take 600 mg by mouth 2 times daily       cyclobenzaprine (FLEXERIL) 10 MG tablet  Take 10 mg by mouth       D3-50 1.25 MG (07011 UT) capsule Take 1 capsule by mouth once a week       docusate sodium (DSS) 100 MG capsule Take 100 mg by mouth as needed       furosemide (LASIX) 20 MG tablet Take 20 mg by mouth daily as needed       gabapentin (NEURONTIN) 800 MG tablet Take 800 mg by mouth 2 times daily       lidocaine (XYLOCAINE) 2 % external gel as needed       Multiple Vitamin (MULTIVITAMIN ADULT PO) Take by mouth daily       mycophenolic acid (GENERIC EQUIVALENT) 180 MG EC tablet TAKE 3 TABLETS (540 MG) BY MOUTH 2 TIMES DAILY 180 tablet 2     omeprazole (PRILOSEC) 20 MG DR capsule Take 20 mg by mouth daily       ondansetron (ZOFRAN ODT) 4 MG ODT tab        ondansetron (ZOFRAN) 4 MG tablet Take 4 mg by mouth every 8 hours as needed       predniSONE (DELTASONE) 5 MG tablet Take 5 mg by mouth daily       sulfamethoxazole-trimethoprim (BACTRIM DS) 800-160 MG tablet Take 1 tablet by mouth as needed       SYNTHROID 137 MCG tablet Take 137 mcg by mouth daily       tacrolimus (GENERIC EQUIVALENT) 1 MG capsule Take 3 capsules (3 mg) by mouth every morning AND 2 capsules (2 mg) every evening. 150 capsule 11     traZODone (DESYREL) 100 MG tablet Take 100 mg by mouth At Bedtime       No current facility-administered medications for this visit.     Medications Discontinued During This Encounter   Medication Reason     estradiol (VAGIFEM) 10 MCG TABS vaginal tablet Therapy completed (No AVS)       Physical Exam  Vital Signs: /74 (BP Location: Right arm, Patient Position: Sitting, Cuff Size: Adult Large)   Pulse 64   Wt 122.1 kg (269 lb 3.2 oz)   BMI 40.93 kg/m      GENERAL APPEARANCE: alert and no distress  HENT: mouth without ulcers or lesions  RESP: lungs clear to auscultation - no rales, rhonchi or wheezes  CV: regular rhythm, normal rate, no rub, no murmur  EDEMA: no LE edema bilaterally  ABDOMEN: soft, nondistended, nontender, bowel sounds normal, obese  MS: extremities normal - no gross  deformities noted, no evidence of inflammation in joints, no muscle tenderness  SKIN: no rash  TX KIDNEY: normal  DIALYSIS ACCESS:  LUE AV fistula with NO thrill    Data        Latest Ref Rng & Units 7/22/2024     9:59 AM 6/27/2024    10:44 AM 11/14/2023     8:52 AM   Renal   Na (external) 136 - 146 mmol/L 140  139  141       K (external) 3.5 - 5.1 mmol/L 4.4  4.1  4.5       Cl 98 - 107 mmol/L 111  107  108       Cl (external) 98 - 107 mmol/L 111  107  108       CO2 (external) 22 - 29 mmol/L 21  24  20       BUN (external) 10.0 - 20.0 mg/dL 18.6  23.7  15.3       Cr (external) 0.57 - 1.11 mg/dL 1.29  1.36  1.29       Glucose (external) 70 - 100 mg/dL 85  84  87       Ca (external) 8.6 - 10.5 mg/dL 9.1  9.2  9.0       Mg (external) 1.8 - 2.6 mg/dL   1.5           This result is from an external source.         Latest Ref Rng & Units 11/14/2023     8:52 AM   Bone Health   Phos (external) 2.9 - 5.2 mg/dL 3.5       PTHi (external) 11.0 - 101.0 pg/mL 118.7       Vit D Def (external) 30.0 - 100.0 ng/mL 49.2           This result is from an external source.         Latest Ref Rng & Units 7/22/2024     9:59 AM 6/27/2024    10:44 AM 11/14/2023     8:52 AM   Heme   WBC (external) 3.7 - 12.1 10(3)/uL 8.4  9.1     5.2       Hgb (external) 11.2 - 15.8 g/dL 13.6  13.8     13.2       Plt (external) 179 - 450 10(3)/uL 254  230     227       ABSOLUTE NEUTROPHILS (EXTERNAL) 1.8 - 9.2 10(3)/uL 5.1     5.6     2.8       ABSOLUTE LYMPHOCYTES (EXTERNAL) 1.2 - 3.9 10(3)/uL 2.5     2.6     1.9       ABSOLUTE MONOCYTES (EXTERNAL) 0.0 - 1.1 10(3)/uL 0.7     0.7     0.5       ABSOLUTE EOSINOPHILS (EXTERNAL) 0.0 - 0.8 10(3)/uL 0.1     0.1     0.1       ABSOLUTE BASOPHILS (EXTERNAL) 0.0 - 0.2 10(3)/uL 0.0     0.0     0.0           This result is from an external source.         Latest Ref Rng & Units 11/14/2023     8:52 AM   Liver   AP (external) 50 - 136 U/L 72       TBili (external) 0.2 - 1.2 mg/dL 0.5       DBili (external) 0.0 - 0.5 mg/dL  0.2       ALT (external) 8 - 45 U/L 10       AST (external) 5 - 41 U/L 15       Tot Protein (external) 6.0 - 8.0 g/dL 6.6       Albumin (external) 3.5 - 5.0 g/dL 3.6           This result is from an external source.         Latest Ref Rng & Units 7/22/2024     9:59 AM 6/27/2024    10:44 AM 11/14/2023     8:52 AM   Pancreas   Amylase (external) 25 - 125 U/L 60  59  51       Lipase (external) 25 - 125 U/L 60  59  51           This result is from an external source.            Latest Ref Rng & Units 4/18/2022     7:47 AM   [facility]   CMV IgG Ext Negative Negative           This result is from an external source.     Failed to redirect to the Timeline version of the REVFS SmartLink.  Recent Labs   Lab Test 03/06/23  1104 06/27/24  1040 07/22/24  0956   DOSTAC 3/5/2023 6/26/2024 7/21/2024   TACROL 7.2 6.5 9.1            Andres Root MD

## 2024-07-26 NOTE — LETTER
7/26/2024       RE: Lisbeth Umaña  [address] [place] [address]     Dear Colleague,    Thank you for referring your patient, Lisbeth Umaña, to the [facility] at [facility]. Please see a copy of my visit note below.    TRANSPLANT NEPHROLOGY CLINIC VISIT     Assessment & Plan  # DDKT (SPK): CKD Stage 3a - Stable   - Baseline Creatinine: ~ 1.2-1.4   - Proteinuria: Normal (<0.2 grams)   - DSA Hx:  Unknown as transplanted at outside center    - Last cPRA: ??%   - BK Viremia: Not checked recently due to time from transplant   - Kidney Tx Biopsy Hx: No history of acute rejection.    # Pancreas Tx (SPK):    - Pancreatic Exocrine Drainage: Enteric drained     - Blood glucose: Euglycemia      On insulin: No   - HbA1c: Stable      Latest HbA1c: 5.1%   - Pancreatic enzymes: Stable   - DSA Hx:  Unknown as transplanted at outside center   - Pancreas Tx Biopsy Hx: No biopsy history    # Immunosuppression: Tacrolimus immediate release (goal 5-8), Mycophenolic acid (dose 540 mg every 12 hours), and Prednisone (dose 5 mg daily)   - Induction with Recent Transplant:  Not known due to time from transplant   - Continue with intensive monitoring of immunosuppression for efficacy and toxicity.   - Historical Changes in Immunosuppression: None   - Changes: No    # Infection Prevention:      Last CD4 Level: 349 (Apr/2022)  - PJP: None  - CMV IgG Ab High Risk Discordance (D+/R-): Yes  - EBV IgG Ab High Risk Discordance (D+/R-): No    # Blood Pressure: Controlled;  Goal BP: > 100, but < 130 systolic   - Changes: No    # Mineral Bone Disorder:    - Vitamin D; level: Normal        On supplement: No  - Calcium; level: Normal        On supplement: Yes    # Electrolytes:   - Potassium; level: Normal        On supplement: No  - Bicarbonate; level: Stable low        On supplement: No    # Obesity, Class III (BMI = 40.9): Stable weight.   - Recommend weight loss  for overall health by increasing exercise and watching caloric intake.   - Patient may benefit from SGLT2 inhibitors.  However, due to pancreas transplant, would avoid GLP1 agonists due to increased risk of pancreatitis.    # Other Significant PMH:   - Dyspnea on Exertion: Appears to be stable for a few years now.  Likely due to deconditioning and obesity.  - Recurrent UTIs: No recent episodes and denies any symptoms.  - GERD: Asymptomatic on PPI.    - Recommend tapering off omeprazole by starting to take it every other day.  If tolerated without significant symptoms, patient can stop omeprazole and just take it or OTC famotidine (Pepcid) as needed for heartburn symptoms.  - Diabetic Gastroparesis: Mostly with am nausea, but rare vomiting and stable symptoms.  - IBS: No recent issues per patient.  - Peripheral Neuropathy: Stable symptoms, mostly in her toes, on gabapentin.  - Left Ovarian Cyst: Patient with large left ovarian cyst that has caused ongoing pain.  The cyst has been increasing in size.  She was seen by Gynecology with discussion of surgery to remove it, but somehow the surgery was never done.   - Will message Gynecology to see if this can be readdressed.     # Skin Cancer Risk:    - Discussed sun protection and recommend regular follow up with Dermatology.    # Transplant History:  Etiology of Kidney Failure: Diabetes mellitus type 1  Tx: SPK  Transplant: 11/7/2016 (Kidney / Pancreas)  Significant transplant-related complications: None    Transplant Office Phone Number: [phone]    Assessment and plan was discussed with the patient and she voiced her understanding and agreement.    Return visit: Return in about 1 year (around 7/26/2025).    Andres Root MD    The longitudinal plan of care for the diagnosis(es)/condition(s) as documented were addressed during this visit. Due to the added complexity in care, I will continue to support Lisbeth in the subsequent management and with ongoing  continuity of care.      Chief Complaint  Ms. Umaña is a 49 year old here for kidney transplant, pancreas transplant, and immunosuppression management.     History of Present Illness   Ms. Umaña reports feeling good overall.  Since last clinic visit:   Hospitalizations: Yes; One episode last year for nausea and vomiting.   New Medical Issues: No  Chest pain or shortness of breath: Yes; Occasional chest pain issues with anxiety.  Stable shortness of breath with exertion.  Lower extremity swelling: Yes; Intermittent episodes and takes furosemide as needed.  Weight change: No  Nausea and vomiting: Yes; Ongoing gastroparesis with stable, occasional nausea about 2x per week and rare vomiting episodes.  Diarrhea: No; Symptoms much better with watching diet and increased dietary fiber.  Heartburn symptoms: No; Controlled on omeprazole.  Fever, sweats or chills: No  Urinary complaints: No    Home BP:  110/60s    Problem List  Patient Active Problem List   Diagnosis     Stage 3a chronic kidney disease (H)     Diabetic gastroparesis (H)     Irritable bowel syndrome     Immunosuppressed status (H24)     Kidney replaced by transplant     Pancreas replaced by transplant (H)     Diabetes mellitus type 1 (H)     Morbid obesity (H)     Recurrent UTI (urinary tract infection)     Hypothyroidism due to Hashimoto's thyroiditis     GERD (gastroesophageal reflux disease)     Female stress incontinence     Diabetic retinopathy of both eyes (H)     Diabetic peripheral neuropathy (H)     Chronic edema     Chronic diarrhea     Vitamin D deficiency     Aftercare following organ transplant     Dyspnea on exertion     Chronic pain       Allergies  Allergies   Allergen Reactions     Iletin Ii Nph, Hives     Nitrofurantoin Hives, Itching and Nausea     Penicillins Hives     Levothyroxine Other (See Comments)     Patient states hair falls out, and nails break off, and she cannot take this. Only Synthroid.     Contrast Dye Nephrotoxicity and Other  (See Comments)       Medications  Current Outpatient Medications   Medication Sig Dispense Refill     calcium carbonate 750 MG CHEW Take 600 mg by mouth 2 times daily       cyclobenzaprine (FLEXERIL) 10 MG tablet Take 10 mg by mouth       D3-50 1.25 MG (08799 UT) capsule Take 1 capsule by mouth once a week       docusate sodium (DSS) 100 MG capsule Take 100 mg by mouth as needed       furosemide (LASIX) 20 MG tablet Take 20 mg by mouth daily as needed       gabapentin (NEURONTIN) 800 MG tablet Take 800 mg by mouth 2 times daily       lidocaine (XYLOCAINE) 2 % external gel as needed       Multiple Vitamin (MULTIVITAMIN ADULT PO) Take by mouth daily       mycophenolic acid (GENERIC EQUIVALENT) 180 MG EC tablet TAKE 3 TABLETS (540 MG) BY MOUTH 2 TIMES DAILY 180 tablet 2     omeprazole (PRILOSEC) 20 MG DR capsule Take 20 mg by mouth daily       ondansetron (ZOFRAN ODT) 4 MG ODT tab        ondansetron (ZOFRAN) 4 MG tablet Take 4 mg by mouth every 8 hours as needed       predniSONE (DELTASONE) 5 MG tablet Take 5 mg by mouth daily       sulfamethoxazole-trimethoprim (BACTRIM DS) 800-160 MG tablet Take 1 tablet by mouth as needed       SYNTHROID 137 MCG tablet Take 137 mcg by mouth daily       tacrolimus (GENERIC EQUIVALENT) 1 MG capsule Take 3 capsules (3 mg) by mouth every morning AND 2 capsules (2 mg) every evening. 150 capsule 11     traZODone (DESYREL) 100 MG tablet Take 100 mg by mouth At Bedtime       No current facility-administered medications for this visit.     Medications Discontinued During This Encounter   Medication Reason     estradiol (VAGIFEM) 10 MCG TABS vaginal tablet Therapy completed (No AVS)       Physical Exam  Vital Signs: /74 (BP Location: Right arm, Patient Position: Sitting, Cuff Size: Adult Large)   Pulse 64   Wt 122.1 kg (269 lb 3.2 oz)   BMI 40.93 kg/m      GENERAL APPEARANCE: alert and no distress  HENT: mouth without ulcers or lesions  RESP: lungs clear to auscultation - no rales,  rhonchi or wheezes  CV: regular rhythm, normal rate, no rub, no murmur  EDEMA: no LE edema bilaterally  ABDOMEN: soft, nondistended, nontender, bowel sounds normal, obese  MS: extremities normal - no gross deformities noted, no evidence of inflammation in joints, no muscle tenderness  SKIN: no rash  TX KIDNEY: normal  DIALYSIS ACCESS:  LUE AV fistula with NO thrill    Data        Latest Ref Rng & Units 7/22/2024     9:59 AM 6/27/2024    10:44 AM 11/14/2023     8:52 AM   Renal   Na (external) 136 - 146 mmol/L 140  139  141       K (external) 3.5 - 5.1 mmol/L 4.4  4.1  4.5       Cl 98 - 107 mmol/L 111  107  108       Cl (external) 98 - 107 mmol/L 111  107  108       CO2 (external) 22 - 29 mmol/L 21  24  20       BUN (external) 10.0 - 20.0 mg/dL 18.6  23.7  15.3       Cr (external) 0.57 - 1.11 mg/dL 1.29  1.36  1.29       Glucose (external) 70 - 100 mg/dL 85  84  87       Ca (external) 8.6 - 10.5 mg/dL 9.1  9.2  9.0       Mg (external) 1.8 - 2.6 mg/dL   1.5           This result is from an external source.         Latest Ref Rng & Units 11/14/2023     8:52 AM   Bone [facility]   Phos (external) 2.9 - 5.2 mg/dL 3.5       PTHi (external) 11.0 - 101.0 pg/mL 118.7       Vit D Def (external) 30.0 - 100.0 ng/mL 49.2           This result is from an external source.         Latest Ref Rng & Units 7/22/2024     9:59 AM 6/27/2024    10:44 AM 11/14/2023     8:52 AM   Heme   WBC (external) 3.7 - 12.1 10(3)/uL 8.4  9.1     5.2       Hgb (external) 11.2 - 15.8 g/dL 13.6  13.8     13.2       Plt (external) 179 - 450 10(3)/uL 254  230     227       ABSOLUTE NEUTROPHILS (EXTERNAL) 1.8 - 9.2 10(3)/uL 5.1     5.6     2.8       ABSOLUTE LYMPHOCYTES (EXTERNAL) 1.2 - 3.9 10(3)/uL 2.5     2.6     1.9       ABSOLUTE MONOCYTES (EXTERNAL) 0.0 - 1.1 10(3)/uL 0.7     0.7     0.5       ABSOLUTE EOSINOPHILS (EXTERNAL) 0.0 - 0.8 10(3)/uL 0.1     0.1     0.1       ABSOLUTE BASOPHILS (EXTERNAL) 0.0 - 0.2 10(3)/uL 0.0     0.0     0.0           This result  is from an external source.         Latest Ref Rng & Units 11/14/2023     8:52 AM   Liver   AP (external) 50 - 136 U/L 72       TBili (external) 0.2 - 1.2 mg/dL 0.5       DBili (external) 0.0 - 0.5 mg/dL 0.2       ALT (external) 8 - 45 U/L 10       AST (external) 5 - 41 U/L 15       Tot Protein (external) 6.0 - 8.0 g/dL 6.6       Albumin (external) 3.5 - 5.0 g/dL 3.6           This result is from an external source.         Latest Ref Rng & Units 7/22/2024     9:59 AM 6/27/2024    10:44 AM 11/14/2023     8:52 AM   Pancreas   Amylase (external) 25 - 125 U/L 60  59  51       Lipase (external) 25 - 125 U/L 60  59  51           This result is from an external source.            Latest Ref Rng & Units 4/18/2022     7:47 AM   [facility]   CMV IgG Ext Negative Negative           This result is from an external source.     Failed to redirect to the Timeline version of the REVFS SmartLink.  Recent Labs   Lab Test 03/06/23  1104 06/27/24  1040 07/22/24  0956   DOSTAC 3/5/2023 6/26/2024 7/21/2024   TACROL 7.2 6.5 9.1              Again, thank you for allowing me to participate in the care of your patient.      Sincerely,    Andres Root MD

## 2024-08-21 DIAGNOSIS — Z94.0 KIDNEY TRANSPLANTED: ICD-10-CM

## 2024-08-21 RX ORDER — MYCOPHENOLIC ACID 180 MG/1
540 TABLET, DELAYED RELEASE ORAL 2 TIMES DAILY
Qty: 180 TABLET | Refills: 11 | Status: SHIPPED | OUTPATIENT
Start: 2024-08-21

## 2024-09-11 ENCOUNTER — TELEPHONE (OUTPATIENT)
Dept: TRANSPLANT | Facility: CLINIC | Age: 49
End: 2024-09-11
Payer: MEDICARE

## 2024-09-11 DIAGNOSIS — N83.209 CYST OF OVARY, UNSPECIFIED LATERALITY: Primary | ICD-10-CM

## 2024-09-11 DIAGNOSIS — Z94.0 KIDNEY REPLACED BY TRANSPLANT: ICD-10-CM

## 2024-09-11 DIAGNOSIS — Z94.83 PANCREAS REPLACED BY TRANSPLANT (H): ICD-10-CM

## 2024-09-11 NOTE — TELEPHONE ENCOUNTER
----- Message from Andres Root sent at 9/11/2024 12:42 PM CDT -----  Regarding: RE: Ovarian Cyst  You can place a referral.    Dale  ----- Message -----  From: Joelle Phan, RN  Sent: 9/11/2024  12:24 PM CDT  To: Andres Root MD  Subject: RE: Ovarian Cyst                                 Hey Dr. Root,     Pt still has not heard from OB/gyn on this. I just spoke to her now. I'm wondering if at this point they would need to see her anyway and I should just place a referral? Pt agreeable to that plan and coming down here for consultation.     Let me know your thoughts or if any specific provider you recommend referring her to.    Thanks,  Joelle  ----- Message -----  From: Andres Root MD  Sent: 7/26/2024   1:59 PM CDT  To: Joelle Phan RN; #  Subject: Ovarian Cyst                                     Dr. Goodson,    You saw Ms. Umaña with a resident (Dr. Saenz) on August 11, 2023 for a large ovarian cyst.  The plan was to surgically remove the cyst and this was to be all set up for the patient.  Unfortunately, the patient reports never being contacted about the surgery or pre-op.  Not sure if that was due to patient having a hospital admission for GI issues (N/V and gastroparesis symptoms, viral?) soon after the visit with you or not.  Either way, it seems she was lost for follow up of this issue.  Her primary Nephrologist has also messaged OB/Gyn and I see a telephone message on December 6, 2023 about this, but I don't think this issue was adequately followed up on.    Can you or one of your colleagues please call the patient and discuss this surgery for the ovarian cyst.    Of note, she is a kidney and pancreas transplant recipient (transplant done at the [facility]) and would likely require a Transplant Surgeon available for this surgery.    Thanks for your follow up on this.    Dale Root  Transplant Nephrology

## 2024-09-11 NOTE — TELEPHONE ENCOUNTER
Referral placed for OB/Gyn surgical consult as pt was lost for surgical planning after consultation last year. Pt agreeable and aware to expect a call. If no call in 1 week she will update me.

## 2024-09-30 ENCOUNTER — OFFICE VISIT (OUTPATIENT)
Dept: OBGYN | Facility: CLINIC | Age: 49
End: 2024-09-30
Attending: INTERNAL MEDICINE
Payer: COMMERCIAL

## 2024-09-30 VITALS
WEIGHT: 268 LBS | DIASTOLIC BLOOD PRESSURE: 83 MMHG | BODY MASS INDEX: 40.62 KG/M2 | HEART RATE: 70 BPM | HEIGHT: 68 IN | SYSTOLIC BLOOD PRESSURE: 122 MMHG

## 2024-09-30 DIAGNOSIS — Z94.83 PANCREAS REPLACED BY TRANSPLANT (H): ICD-10-CM

## 2024-09-30 DIAGNOSIS — Z94.0 KIDNEY REPLACED BY TRANSPLANT: ICD-10-CM

## 2024-09-30 DIAGNOSIS — N83.209 CYST OF OVARY, UNSPECIFIED LATERALITY: ICD-10-CM

## 2024-09-30 PROCEDURE — 99214 OFFICE O/P EST MOD 30 MIN: CPT | Performed by: OBSTETRICS & GYNECOLOGY

## 2024-09-30 NOTE — PATIENT INSTRUCTIONS
Thank you for trusting us with your care!   Please be aware, if you are on Mychart, you may see your results prior to your providers review. If labs are abnormal, we will call or message you on [employer] with a follow up plan.    If you need to contact us for questions about:  Symptoms, Scheduling & Medical Questions; Non-urgent (2-3 day response) [employer] message, Urgent (needing response today) [phone] (if after 3:30pm next day response)   Prescriptions: Please call your Pharmacy   Billing: Anny [phone] or [facility]:[phone]

## 2024-09-30 NOTE — LETTER
9/30/2024       RE: Lisbeth Umaña  [address] [place] [address]     Dear Colleague,    Thank you for referring your patient, Lisbeth Umaña, to the [facility] [place] [facility]. Please see a copy of my visit note below.    Lisbeth Umaña is a 49 year old G0, postmenopausal female here for large left adnexal mass. She has a pertinent medical and surgical history including transplant of kidney and pancrease in 2014 at [facility]. She does receive care at [facility], but would want oophorectomy done at a center with Transplant available in case there are concerns.      She reports that she was found to have a large left ovarian cyst identified on pelvic imaging for routine transplant care. Her primary transplant team referred her to [facility] for surgical planning based on recommendations for removal due to proximity of cyst to grafter kidney and pressures on ureter. She initially had visit 8/2023, but somehow surgery was never scheduled. She is concerned about the cyst getting larger and causing more issue.      She reports being symptomatic only when bending over and feels like it pushes into her rib cage but otherwise denies symptoms. She is feeling well given her complex medical history.      Her gynecologic history is complicated by early suspected menopause in 2014 around time of transplant. She has not had a period since then.      Last pap smear was 2022, NILM HPV negative     Patients records are available and reviewed at today's visit.    Past GYN history:  no std history.   Last PAP smear:  Normal  Last TSH: ln 2023.   Past Medical History:   Diagnosis Date     Anemia in chronic renal disease      Chronic diarrhea      Chronic edema      Chronic kidney disease, stage 3 (H) 08/05/2021     Diabetes mellitus type 1 (H)      Diabetic gastroparesis (H)      Diabetic peripheral neuropathy (H)      Diabetic retinopathy of both  eyes (H)      End stage renal disease (H)      Female stress incontinence      Fibrocystic breast disease      GERD (gastroesophageal reflux disease)      Hypertension, renal      Hypothyroidism due to Hashimoto's thyroiditis      Immunosuppression (H)      Irritable bowel syndrome      Kidney replaced by transplant      Morbid obesity (H)      Pancreas replaced by transplant (H)      Recurrent UTI (urinary tract infection)      Secondary renal hyperparathyroidism (H)      Seizure due to hypoglycemia (H)      Vitamin D deficiency        Past Surgical History:   Procedure Laterality Date     ADENOIDECTOMY       IR DIALYSIS FISTULOGRAM LEFT       KIDNEY AND PANCREAS COMBINED TRANSPLANTATION       SHOULDER SURGERY Right      WISDOM TOOTH EXTRACTION         Family History   Problem Relation Age of Onset     Hypertension Mother      Diabetes Type 2  Mother      Kidney Disease Mother         Glucosuria     Gout Mother      Hypertension Father      Diabetes Type 2  Father      Prostate Cancer Father      Sarcoidosis Father      Hypertension Brother      Liver Disease Brother      Breast Cancer Maternal Grandmother        Allergies: Iletin ii nph,; Nitrofurantoin; Penicillins; Levothyroxine; and Contrast dye    Current Outpatient Medications   Medication Sig Dispense Refill     calcium carbonate 750 MG CHEW Take 600 mg by mouth 2 times daily       cyclobenzaprine (FLEXERIL) 10 MG tablet Take 10 mg by mouth       D3-50 1.25 MG (22319 UT) capsule Take 1 capsule by mouth once a week       docusate sodium (DSS) 100 MG capsule Take 100 mg by mouth as needed       furosemide (LASIX) 20 MG tablet Take 20 mg by mouth daily as needed       lidocaine (XYLOCAINE) 2 % external gel as needed       Multiple Vitamin (MULTIVITAMIN ADULT PO) Take by mouth daily       mycophenolic acid (GENERIC EQUIVALENT) 180 MG EC tablet TAKE 3 TABLETS (540 MG) BY MOUTH 2 TIMES DAILY 180 tablet 11     omeprazole (PRILOSEC) 20 MG DR capsule Take 20 mg by  "mouth daily       ondansetron (ZOFRAN ODT) 4 MG ODT tab        ondansetron (ZOFRAN) 4 MG tablet Take 4 mg by mouth every 8 hours as needed       predniSONE (DELTASONE) 5 MG tablet Take 5 mg by mouth daily       sulfamethoxazole-trimethoprim (BACTRIM DS) 800-160 MG tablet Take 1 tablet by mouth as needed       SYNTHROID 137 MCG tablet Take 137 mcg by mouth daily       tacrolimus (GENERIC EQUIVALENT) 1 MG capsule Take 3 capsules (3 mg) by mouth every morning AND 2 capsules (2 mg) every evening. 150 capsule 11     traZODone (DESYREL) 100 MG tablet Take 100 mg by mouth At Bedtime       gabapentin (NEURONTIN) 800 MG tablet Take 800 mg by mouth 2 times daily         ROS:  C: NEGATIVE for fever, chills, change in weight  I: NEGATIVE for worrisome rashes, moles or lesions  E: NEGATIVE for vision changes or irritation  E/M: NEGATIVE for ear, mouth and throat problems  R: NEGATIVE for significant cough or SOB  CV: NEGATIVE for chest pain, palpitations or peripheral edema  GI: NEGATIVE for nausea, abdominal pain, heartburn, or change in bowel habits  : NEGATIVE for frequency, dysuria, hematuria, vaginal discharge  M: NEGATIVE for significant arthralgias or myalgia  N: NEGATIVE for weakness, dizziness or paresthesias  E: NEGATIVE for temperature intolerance, skin/hair changes  P: NEGATIVE for changes in mood or affect    EXAM:  Blood pressure 122/83, pulse 70, height 1.727 m (5' 8\"), weight 121.6 kg (268 lb).   BMI= Body mass index is 40.75 kg/m .  General - pleasant female in no acute distress.  Lungs - non labored breathing  Heart - well perfused  Abdomen - soft, nontender, nondistended, no hepatosplenomegaly.  Rectovaginal - deferred.  Musculoskeletal - no gross deformities.  Neurological - normal strength, sensation, and mental status.      ASSESSMENT/PLAN: 48 yo  female w/ extensive abdominal surgery history and renal/pancreas transplant.  Has large ovarian cyst that at last exam was at least 8cm in largest " dimension.    (N83.209) Cyst of ovary, unspecified laterality  Repeat uls to evaluate change in size.  Given concern by transplant team, they do recommend removal d/t pressure on ureter of transplant kidney    Kidney replaced by transplant and Pancreas replaced by transplant (H). Given extensive surgical history as well as position of her transplanted pancreas, I would recommend transplant surgery be present for placing laparoscopic ports to assure safety and integrity of transplanted pancreas and kidney.     Rachel Jones MD, FACOG  (she/her/hers)    Department of Ob/Gyn/Women's Health  [facility]  [facility] [address] [place] [address]  [email or web address]  p. [phone]  f. [phone]            Again, thank you for allowing me to participate in the care of your patient.      Sincerely,    Rachel Jones MD

## 2024-10-01 ENCOUNTER — PREP FOR PROCEDURE (OUTPATIENT)
Dept: OBGYN | Facility: CLINIC | Age: 49
End: 2024-10-01
Payer: MEDICARE

## 2024-10-01 DIAGNOSIS — N83.202 LEFT OVARIAN CYST: Primary | ICD-10-CM

## 2024-10-01 RX ORDER — ACETAMINOPHEN 325 MG/1
975 TABLET ORAL ONCE
OUTPATIENT
Start: 2024-10-01 | End: 2024-10-01

## 2024-10-01 NOTE — PROGRESS NOTES
Lisbeth Umaña is a 49 year old G0, postmenopausal female here for large left adnexal mass. She has a pertinent medical and surgical history including transplant of kidney and pancrease in 2014 at [facility]. She does receive care at [facility], but would want oophorectomy done at a center with Transplant available in case there are concerns.      She reports that she was found to have a large left ovarian cyst identified on pelvic imaging for routine transplant care. Her primary transplant team referred her to [facility] for surgical planning based on recommendations for removal due to proximity of cyst to grafter kidney and pressures on ureter. She initially had visit 8/2023, but somehow surgery was never scheduled. She is concerned about the cyst getting larger and causing more issue.      She reports being symptomatic only when bending over and feels like it pushes into her rib cage but otherwise denies symptoms. She is feeling well given her complex medical history.      Her gynecologic history is complicated by early suspected menopause in 2014 around time of transplant. She has not had a period since then.      Last pap smear was 2022, NILM HPV negative     Patients records are available and reviewed at today's visit.    Past GYN history:  no std history.   Last PAP smear:  Normal  Last TSH: ln 2023.   Past Medical History:   Diagnosis Date    Anemia in chronic renal disease     Chronic diarrhea     Chronic edema     Chronic kidney disease, stage 3 (H) 08/05/2021    Diabetes mellitus type 1 (H)     Diabetic gastroparesis (H)     Diabetic peripheral neuropathy (H)     Diabetic retinopathy of both eyes (H)     End stage renal disease (H)     Female stress incontinence     Fibrocystic breast disease     GERD (gastroesophageal reflux disease)     Hypertension, renal     Hypothyroidism due to Hashimoto's thyroiditis     Immunosuppression (H)     Irritable bowel syndrome     Kidney replaced by transplant     Morbid  obesity (H)     Pancreas replaced by transplant (H)     Recurrent UTI (urinary tract infection)     Secondary renal hyperparathyroidism (H)     Seizure due to hypoglycemia (H)     Vitamin D deficiency        Past Surgical History:   Procedure Laterality Date    ADENOIDECTOMY      IR DIALYSIS FISTULOGRAM LEFT      KIDNEY AND PANCREAS COMBINED TRANSPLANTATION      SHOULDER SURGERY Right     WISDOM TOOTH EXTRACTION         Family History   Problem Relation Age of Onset    Hypertension Mother     Diabetes Type 2  Mother     Kidney Disease Mother         Glucosuria    Gout Mother     Hypertension Father     Diabetes Type 2  Father     Prostate Cancer Father     Sarcoidosis Father     Hypertension Brother     Liver Disease Brother     Breast Cancer Maternal Grandmother        Allergies: Iletin ii nph,; Nitrofurantoin; Penicillins; Levothyroxine; and Contrast dye    Current Outpatient Medications   Medication Sig Dispense Refill    calcium carbonate 750 MG CHEW Take 600 mg by mouth 2 times daily      cyclobenzaprine (FLEXERIL) 10 MG tablet Take 10 mg by mouth      D3-50 1.25 MG (68983 UT) capsule Take 1 capsule by mouth once a week      docusate sodium (DSS) 100 MG capsule Take 100 mg by mouth as needed      furosemide (LASIX) 20 MG tablet Take 20 mg by mouth daily as needed      lidocaine (XYLOCAINE) 2 % external gel as needed      Multiple Vitamin (MULTIVITAMIN ADULT PO) Take by mouth daily      mycophenolic acid (GENERIC EQUIVALENT) 180 MG EC tablet TAKE 3 TABLETS (540 MG) BY MOUTH 2 TIMES DAILY 180 tablet 11    omeprazole (PRILOSEC) 20 MG DR capsule Take 20 mg by mouth daily      ondansetron (ZOFRAN ODT) 4 MG ODT tab       ondansetron (ZOFRAN) 4 MG tablet Take 4 mg by mouth every 8 hours as needed      predniSONE (DELTASONE) 5 MG tablet Take 5 mg by mouth daily      sulfamethoxazole-trimethoprim (BACTRIM DS) 800-160 MG tablet Take 1 tablet by mouth as needed      SYNTHROID 137 MCG tablet Take 137 mcg by mouth daily    "   tacrolimus (GENERIC EQUIVALENT) 1 MG capsule Take 3 capsules (3 mg) by mouth every morning AND 2 capsules (2 mg) every evening. 150 capsule 11    traZODone (DESYREL) 100 MG tablet Take 100 mg by mouth At Bedtime      gabapentin (NEURONTIN) 800 MG tablet Take 800 mg by mouth 2 times daily         ROS:  C: NEGATIVE for fever, chills, change in weight  I: NEGATIVE for worrisome rashes, moles or lesions  E: NEGATIVE for vision changes or irritation  E/M: NEGATIVE for ear, mouth and throat problems  R: NEGATIVE for significant cough or SOB  CV: NEGATIVE for chest pain, palpitations or peripheral edema  GI: NEGATIVE for nausea, abdominal pain, heartburn, or change in bowel habits  : NEGATIVE for frequency, dysuria, hematuria, vaginal discharge  M: NEGATIVE for significant arthralgias or myalgia  N: NEGATIVE for weakness, dizziness or paresthesias  E: NEGATIVE for temperature intolerance, skin/hair changes  P: NEGATIVE for changes in mood or affect    EXAM:  Blood pressure 122/83, pulse 70, height 1.727 m (5' 8\"), weight 121.6 kg (268 lb).   BMI= Body mass index is 40.75 kg/m .  General - pleasant female in no acute distress.  Lungs - non labored breathing  Heart - well perfused  Abdomen - soft, nontender, nondistended, no hepatosplenomegaly.  Rectovaginal - deferred.  Musculoskeletal - no gross deformities.  Neurological - normal strength, sensation, and mental status.      ASSESSMENT/PLAN: 48 yo  female w/ extensive abdominal surgery history and renal/pancreas transplant.  Has large ovarian cyst that at last exam was at least 8cm in largest dimension.    (N83.209) Cyst of ovary, unspecified laterality  Repeat uls to evaluate change in size.  Given concern by transplant team, they do recommend removal d/t pressure on ureter of transplant kidney    Kidney replaced by transplant and Pancreas replaced by transplant (H). Given extensive surgical history as well as position of her transplanted pancreas, I would " recommend transplant surgery be present for placing laparoscopic ports to assure safety and integrity of transplanted pancreas and kidney.     Rachel Jones MD, FACOG  (she/her/hers)    Department of Ob/Gyn/Women's Health  [facility]  [facility]  [address] [place] [address]  [email or web address]  p. [phone]  f. [phone]

## 2024-10-01 NOTE — PROGRESS NOTES
Performing surgeon: Self    Surgeon Procedure time (incision-close in minutes): 120    Surgery location: [place]     Urgency of Surgery?: Patients Choice/ Next Available    Is this a multi surgeon case?: Yes  List the additional surgeons: Transplant renal/pancreas.  Need one of them present to place ports given her pancreas transplant and uncertain positioning  If yes, please list the department and/or specific surgeon    COVID test needed?: No   If yes, please place order for PCR test    H&P to be completed by?:PCP    Postop appointment?: 3 weeks    Time off work: Time Off Work: 6 Weeks    If the patient is receiving gender-affirming care, did you inform them of the need for two letters of support: NA    Other comments:      Current BMI:   BMI Readings from Last 1 Encounters:   09/30/24 40.75 kg/m      Rachel Jones MD, FACOG  (she/her/hers)    Department of Ob/Gyn/Women's Health  [employer] [facility] [employer]  [place] [facility]  [address] [place] [address]. [place] [address]  [email or web address]  p. [phone]  f. [phone]

## 2024-10-05 ENCOUNTER — HEALTH MAINTENANCE LETTER (OUTPATIENT)
Age: 49
End: 2024-10-05

## 2024-10-29 ENCOUNTER — TELEPHONE (OUTPATIENT)
Dept: TRANSPLANT | Facility: CLINIC | Age: 49
End: 2024-10-29
Payer: MEDICARE

## 2024-10-29 NOTE — TELEPHONE ENCOUNTER
Saroj called, as their office is having difficulty reaching Lisbeth to complete her routine general nephrology follow up. They sent a dermatology referral, which will also be closed, due to maximum attempts to reach.    Saroj shared their contacts, which align with [facility] contact for Lisbeth.  PCS explained that our team will work again at encouraging adherence to medical follow up, as she is also overdue for labs with [facility].

## 2024-11-06 ENCOUNTER — PREP FOR PROCEDURE (OUTPATIENT)
Dept: OBGYN | Facility: CLINIC | Age: 49
End: 2024-11-06
Payer: MEDICARE

## 2024-11-06 DIAGNOSIS — N83.202 LEFT OVARIAN CYST: Primary | ICD-10-CM

## 2024-11-06 RX ORDER — PHENAZOPYRIDINE HYDROCHLORIDE 100 MG/1
200 TABLET, FILM COATED ORAL ONCE
OUTPATIENT
Start: 2024-11-06 | End: 2024-11-06

## 2024-11-06 RX ORDER — ACETAMINOPHEN 325 MG/1
975 TABLET ORAL ONCE
OUTPATIENT
Start: 2024-11-06 | End: 2024-11-06

## 2024-11-19 ENCOUNTER — TELEPHONE (OUTPATIENT)
Dept: OBGYN | Facility: CLINIC | Age: 49
End: 2024-11-19
Payer: MEDICARE

## 2024-12-10 ENCOUNTER — LAB (OUTPATIENT)
Dept: LAB | Facility: CLINIC | Age: 49
End: 2024-12-10
Payer: COMMERCIAL

## 2024-12-10 DIAGNOSIS — Z94.0 KIDNEY TRANSPLANTED: Primary | ICD-10-CM

## 2024-12-10 PROCEDURE — 80197 ASSAY OF TACROLIMUS: CPT | Performed by: INTERNAL MEDICINE

## 2024-12-11 LAB
TACROLIMUS BLD-MCNC: 7.4 UG/L (ref 5–15)
TME LAST DOSE: NORMAL H
TME LAST DOSE: NORMAL H

## 2024-12-16 ENCOUNTER — ANESTHESIA EVENT (OUTPATIENT)
Dept: SURGERY | Facility: CLINIC | Age: 49
End: 2024-12-16
Payer: COMMERCIAL

## 2024-12-17 ENCOUNTER — ANESTHESIA (OUTPATIENT)
Dept: SURGERY | Facility: CLINIC | Age: 49
End: 2024-12-17
Payer: COMMERCIAL

## 2024-12-17 ENCOUNTER — HOSPITAL ENCOUNTER (OUTPATIENT)
Facility: CLINIC | Age: 49
End: 2024-12-17
Attending: OBSTETRICS & GYNECOLOGY | Admitting: OBSTETRICS & GYNECOLOGY
Payer: COMMERCIAL

## 2024-12-17 VITALS
WEIGHT: 285.06 LBS | DIASTOLIC BLOOD PRESSURE: 69 MMHG | SYSTOLIC BLOOD PRESSURE: 108 MMHG | TEMPERATURE: 97.5 F | OXYGEN SATURATION: 100 % | RESPIRATION RATE: 16 BRPM | HEIGHT: 68 IN | BODY MASS INDEX: 43.2 KG/M2 | HEART RATE: 71 BPM

## 2024-12-17 DIAGNOSIS — N83.202 LEFT OVARIAN CYST: ICD-10-CM

## 2024-12-17 LAB
ABO + RH BLD: NORMAL
BASOPHILS # BLD AUTO: 0.1 10E3/UL (ref 0–0.2)
BASOPHILS NFR BLD AUTO: 1 %
BLD GP AB SCN SERPL QL: NEGATIVE
EOSINOPHIL # BLD AUTO: 0.2 10E3/UL (ref 0–0.7)
EOSINOPHIL NFR BLD AUTO: 3 %
ERYTHROCYTE [DISTWIDTH] IN BLOOD BY AUTOMATED COUNT: 13.6 % (ref 10–15)
HCG UR QL: NEGATIVE
HCT VFR BLD AUTO: 39.4 % (ref 35–47)
HGB BLD-MCNC: 12.7 G/DL (ref 11.7–15.7)
IMM GRANULOCYTES # BLD: 0 10E3/UL
IMM GRANULOCYTES NFR BLD: 1 %
LYMPHOCYTES # BLD AUTO: 3.2 10E3/UL (ref 0.8–5.3)
LYMPHOCYTES NFR BLD AUTO: 41 %
MCH RBC QN AUTO: 30.3 PG (ref 26.5–33)
MCHC RBC AUTO-ENTMCNC: 32.2 G/DL (ref 31.5–36.5)
MCV RBC AUTO: 94 FL (ref 78–100)
MONOCYTES # BLD AUTO: 0.5 10E3/UL (ref 0–1.3)
MONOCYTES NFR BLD AUTO: 7 %
NEUTROPHILS # BLD AUTO: 3.8 10E3/UL (ref 1.6–8.3)
NEUTROPHILS NFR BLD AUTO: 49 %
NRBC # BLD AUTO: 0 10E3/UL
NRBC BLD AUTO-RTO: 0 /100
PLATELET # BLD AUTO: 220 10E3/UL (ref 150–450)
RBC # BLD AUTO: 4.19 10E6/UL (ref 3.8–5.2)
SPECIMEN EXP DATE BLD: NORMAL
TSH SERPL DL<=0.005 MIU/L-ACNC: 90.2 UIU/ML (ref 0.3–4.2)
WBC # BLD AUTO: 7.9 10E3/UL (ref 4–11)

## 2024-12-17 PROCEDURE — 85004 AUTOMATED DIFF WBC COUNT: CPT | Performed by: OBSTETRICS & GYNECOLOGY

## 2024-12-17 PROCEDURE — 999N000141 HC STATISTIC PRE-PROCEDURE NURSING ASSESSMENT: Performed by: OBSTETRICS & GYNECOLOGY

## 2024-12-17 PROCEDURE — 84443 ASSAY THYROID STIM HORMONE: CPT | Performed by: ANESTHESIOLOGY

## 2024-12-17 PROCEDURE — 250N000013 HC RX MED GY IP 250 OP 250 PS 637: Performed by: OBSTETRICS & GYNECOLOGY

## 2024-12-17 PROCEDURE — 999N000001 HC CANCELLED SURGERY UP TO 15 MINS: Performed by: OBSTETRICS & GYNECOLOGY

## 2024-12-17 PROCEDURE — 86900 BLOOD TYPING SEROLOGIC ABO: CPT | Performed by: OBSTETRICS & GYNECOLOGY

## 2024-12-17 PROCEDURE — 85041 AUTOMATED RBC COUNT: CPT | Performed by: OBSTETRICS & GYNECOLOGY

## 2024-12-17 PROCEDURE — 81025 URINE PREGNANCY TEST: CPT | Performed by: OBSTETRICS & GYNECOLOGY

## 2024-12-17 RX ORDER — ACETAMINOPHEN 325 MG/1
975 TABLET ORAL ONCE
Status: DISCONTINUED | OUTPATIENT
Start: 2024-12-17 | End: 2024-12-23 | Stop reason: HOSPADM

## 2024-12-17 RX ORDER — FENTANYL CITRATE 50 UG/ML
25-50 INJECTION, SOLUTION INTRAMUSCULAR; INTRAVENOUS
Status: DISCONTINUED | OUTPATIENT
Start: 2024-12-17 | End: 2024-12-23 | Stop reason: HOSPADM

## 2024-12-17 RX ORDER — ONDANSETRON 4 MG/1
4 TABLET, ORALLY DISINTEGRATING ORAL EVERY 30 MIN PRN
Status: CANCELLED | OUTPATIENT
Start: 2024-12-17

## 2024-12-17 RX ORDER — ONDANSETRON 2 MG/ML
4 INJECTION INTRAMUSCULAR; INTRAVENOUS EVERY 30 MIN PRN
Status: CANCELLED | OUTPATIENT
Start: 2024-12-17

## 2024-12-17 RX ORDER — HYDRALAZINE HYDROCHLORIDE 20 MG/ML
2.5-5 INJECTION INTRAMUSCULAR; INTRAVENOUS EVERY 10 MIN PRN
Status: CANCELLED | OUTPATIENT
Start: 2024-12-17

## 2024-12-17 RX ORDER — CEFAZOLIN SODIUM/WATER 3 G/30 ML
3 SYRINGE (ML) INTRAVENOUS
Status: DISCONTINUED | OUTPATIENT
Start: 2024-12-17 | End: 2024-12-23 | Stop reason: HOSPADM

## 2024-12-17 RX ORDER — OXYCODONE HYDROCHLORIDE 10 MG/1
10 TABLET ORAL
Status: CANCELLED | OUTPATIENT
Start: 2024-12-17

## 2024-12-17 RX ORDER — NALOXONE HYDROCHLORIDE 0.4 MG/ML
0.1 INJECTION, SOLUTION INTRAMUSCULAR; INTRAVENOUS; SUBCUTANEOUS
Status: CANCELLED | OUTPATIENT
Start: 2024-12-17

## 2024-12-17 RX ORDER — ACETAMINOPHEN 325 MG/1
975 TABLET ORAL ONCE
Status: COMPLETED | OUTPATIENT
Start: 2024-12-17 | End: 2024-12-17

## 2024-12-17 RX ORDER — PHENAZOPYRIDINE HYDROCHLORIDE 200 MG/1
200 TABLET, FILM COATED ORAL ONCE
Status: COMPLETED | OUTPATIENT
Start: 2024-12-17 | End: 2024-12-17

## 2024-12-17 RX ORDER — DEXAMETHASONE SODIUM PHOSPHATE 4 MG/ML
4 INJECTION, SOLUTION INTRA-ARTICULAR; INTRALESIONAL; INTRAMUSCULAR; INTRAVENOUS; SOFT TISSUE
Status: CANCELLED | OUTPATIENT
Start: 2024-12-17

## 2024-12-17 RX ORDER — HYDROMORPHONE HYDROCHLORIDE 1 MG/ML
0.2 INJECTION, SOLUTION INTRAMUSCULAR; INTRAVENOUS; SUBCUTANEOUS EVERY 5 MIN PRN
Status: CANCELLED | OUTPATIENT
Start: 2024-12-17

## 2024-12-17 RX ORDER — SODIUM CHLORIDE, SODIUM LACTATE, POTASSIUM CHLORIDE, CALCIUM CHLORIDE 600; 310; 30; 20 MG/100ML; MG/100ML; MG/100ML; MG/100ML
INJECTION, SOLUTION INTRAVENOUS CONTINUOUS
Status: CANCELLED | OUTPATIENT
Start: 2024-12-17

## 2024-12-17 RX ORDER — FENTANYL CITRATE 50 UG/ML
50 INJECTION, SOLUTION INTRAMUSCULAR; INTRAVENOUS EVERY 5 MIN PRN
Status: CANCELLED | OUTPATIENT
Start: 2024-12-17

## 2024-12-17 RX ORDER — CEFAZOLIN SODIUM/WATER 3 G/30 ML
3 SYRINGE (ML) INTRAVENOUS SEE ADMIN INSTRUCTIONS
Status: DISCONTINUED | OUTPATIENT
Start: 2024-12-17 | End: 2024-12-23 | Stop reason: HOSPADM

## 2024-12-17 RX ORDER — OXYCODONE HYDROCHLORIDE 5 MG/1
5 TABLET ORAL
Status: CANCELLED | OUTPATIENT
Start: 2024-12-17

## 2024-12-17 RX ORDER — LEVOTHYROXINE SODIUM 150 UG/1
150 TABLET ORAL DAILY
COMMUNITY

## 2024-12-17 RX ORDER — HYDROMORPHONE HYDROCHLORIDE 1 MG/ML
0.4 INJECTION, SOLUTION INTRAMUSCULAR; INTRAVENOUS; SUBCUTANEOUS EVERY 5 MIN PRN
Status: CANCELLED | OUTPATIENT
Start: 2024-12-17

## 2024-12-17 RX ORDER — FENTANYL CITRATE 50 UG/ML
25 INJECTION, SOLUTION INTRAMUSCULAR; INTRAVENOUS EVERY 5 MIN PRN
Status: CANCELLED | OUTPATIENT
Start: 2024-12-17

## 2024-12-17 RX ADMIN — ACETAMINOPHEN 975 MG: 325 TABLET, FILM COATED ORAL at 06:43

## 2024-12-17 RX ADMIN — PHENAZOPYRIDINE 200 MG: 200 TABLET ORAL at 06:43

## 2024-12-17 ASSESSMENT — ACTIVITIES OF DAILY LIVING (ADL)
ADLS_ACUITY_SCORE: 41

## 2024-12-17 NOTE — PROGRESS NOTES
Paged Dr. Grossman for Dr. Jones to discuss surgical plan for this patient.  We are also waiting for TSH to result before proceeding with the TAP block.  TSH value too high to proceed with surgery. Procedure cancelled, IV removed, all belongings sent with  and patient and they left the unit at 0820.

## 2024-12-18 ASSESSMENT — ACTIVITIES OF DAILY LIVING (ADL)
ADLS_ACUITY_SCORE: 41

## 2024-12-19 ASSESSMENT — ACTIVITIES OF DAILY LIVING (ADL)
ADLS_ACUITY_SCORE: 41

## 2024-12-20 ASSESSMENT — ACTIVITIES OF DAILY LIVING (ADL)
ADLS_ACUITY_SCORE: 41

## 2024-12-21 ASSESSMENT — ACTIVITIES OF DAILY LIVING (ADL)
ADLS_ACUITY_SCORE: 41

## 2024-12-22 ASSESSMENT — ACTIVITIES OF DAILY LIVING (ADL)
ADLS_ACUITY_SCORE: 41

## 2025-01-06 ENCOUNTER — LAB (OUTPATIENT)
Dept: LAB | Facility: CLINIC | Age: 50
End: 2025-01-06
Payer: COMMERCIAL

## 2025-01-06 DIAGNOSIS — Z94.0 KIDNEY TRANSPLANTED: ICD-10-CM

## 2025-01-06 DIAGNOSIS — Z94.0 KIDNEY REPLACED BY TRANSPLANT: Primary | ICD-10-CM

## 2025-01-06 LAB
TACROLIMUS BLD-MCNC: 5.9 UG/L (ref 5–15)
TME LAST DOSE: NORMAL H
TME LAST DOSE: NORMAL H

## 2025-01-06 PROCEDURE — 80197 ASSAY OF TACROLIMUS: CPT | Performed by: INTERNAL MEDICINE

## 2025-01-19 ENCOUNTER — HEALTH MAINTENANCE LETTER (OUTPATIENT)
Age: 50
End: 2025-01-19

## 2025-02-10 ENCOUNTER — TELEPHONE (OUTPATIENT)
Dept: TRANSPLANT | Facility: CLINIC | Age: 50
End: 2025-02-10
Payer: MEDICARE

## 2025-02-10 DIAGNOSIS — Z94.0 KIDNEY TRANSPLANTED: Primary | ICD-10-CM

## 2025-02-10 RX ORDER — PREDNISONE 5 MG/1
5 TABLET ORAL DAILY
Qty: 30 TABLET | Refills: 11 | Status: SHIPPED | OUTPATIENT
Start: 2025-02-10

## 2025-02-10 RX ORDER — MYCOPHENOLIC ACID 180 MG/1
540 TABLET, DELAYED RELEASE ORAL 2 TIMES DAILY
Qty: 180 TABLET | Refills: 11 | Status: SHIPPED | OUTPATIENT
Start: 2025-02-10

## 2025-02-10 RX ORDER — TACROLIMUS 1 MG/1
CAPSULE ORAL
Qty: 150 CAPSULE | Refills: 11 | Status: SHIPPED | OUTPATIENT
Start: 2025-02-10

## 2025-02-10 NOTE — TELEPHONE ENCOUNTER
Patient Call: General  Route to LPN    Reason for call: Patient looking to touch base and give new insurance information update and get a refill request setup at new location for medications:  tacrolimus (GENERIC EQUIVALENT) 1 MG capsule   predniSONE (DELTASONE) 5 MG tablet   mycophenolic acid (GENERIC EQUIVALENT) 180 MG EC tablet . More details with call back.      Call back needed? Yes    Return Call Needed  Same as documented in contacts section  When to return call?: Same day: Route High Priority

## 2025-02-19 ENCOUNTER — TELEPHONE (OUTPATIENT)
Dept: OBGYN | Facility: CLINIC | Age: 50
End: 2025-02-19
Payer: MEDICARE

## 2025-02-19 NOTE — TELEPHONE ENCOUNTER
[facility] Call Center    Phone Message    May a detailed message be left on voicemail: yes     Reason for Call: Other: Patient is requesting call back from provider or care team in regards to r/s surgery. Patient states she has not heard back from the team since her last message 1/24/2025. Please review and contact patient- [phone]      Action Taken: Message routed to:  Other: S OB/GYN    Travel Screening: Not Applicable     Date of Service:

## 2025-02-20 NOTE — TELEPHONE ENCOUNTER
Pt requesting to schedule procedure.     Was last seen 9/30/2024 with Dr. Jones for large left adnexal mass. Total hysterectomy procedure was scheduled 12/17/2024 but ultimately cancelled, due to thyroid lab result per patient. Has hx of kidney and pancrease transplant and needs transplant team available for procedure as well.    Patient notably anxious and concerned, as she didn't hear back from clinic when she called last month. Ensured the patient that staff is actively working on this and will contact her with updates as soon as possible.    Case request for EXAM UNDER ANESTHESIA, PELVIS, robot assisted left salpingo-oophorectomy, right salpingectomy, possible cystoscopy requested from provider. Clarified if another visit and/or labs is needed.

## 2025-03-12 ENCOUNTER — TELEPHONE (OUTPATIENT)
Dept: OBGYN | Facility: CLINIC | Age: 50
End: 2025-03-12
Payer: MEDICARE

## 2025-03-12 NOTE — TELEPHONE ENCOUNTER
Spoke with patient, scheduled surgery. Patient is aware of date, time, location, prep instructions, need for H&P within 30 days.    Type of surgery: EXAM UNDER ANESTHESIA, PELVIS, robot assisted left salpingo-ophorectomy, right salpingectomy   possible cystoscopy   Location of surgery: [facility]  Date and time of surgery: 4/4/25 @ 11:15am  Surgeon: Dr. Rachel Jones  Pre-Op H&P Date: 3/18/25 with PAC  Post-Op Appt Date: 6 weeks after surgery   Packet sent out: Yes  Pre-cert/Authorization completed:  No  Date: 3/12/25

## 2025-03-13 NOTE — TELEPHONE ENCOUNTER
FUTURE VISIT INFORMATION      SURGERY INFORMATION:  Date: 4/4/25  Location: [facility] OR  Surgeon:  Rachel Jones MD   Anesthesia Type:  general  Procedure: EXAM UNDER ANESTHESIA, PELVIS, robot assisted left salpingo-ophorectomy, right salpingectomy possible cystoscopy     RECORDS REQUESTED FROM:       Primary Care Provider: Marielos Mercado MBBS  - [facility]

## 2025-03-18 ENCOUNTER — ANESTHESIA EVENT (OUTPATIENT)
Dept: SURGERY | Facility: CLINIC | Age: 50
End: 2025-03-18
Payer: COMMERCIAL

## 2025-03-18 ENCOUNTER — PRE VISIT (OUTPATIENT)
Dept: SURGERY | Facility: CLINIC | Age: 50
End: 2025-03-18

## 2025-03-18 ENCOUNTER — VIRTUAL VISIT (OUTPATIENT)
Dept: SURGERY | Facility: CLINIC | Age: 50
End: 2025-03-18
Payer: COMMERCIAL

## 2025-03-18 VITALS — WEIGHT: 270 LBS | HEIGHT: 68 IN | BODY MASS INDEX: 40.92 KG/M2

## 2025-03-18 DIAGNOSIS — R06.09 EXERTIONAL DYSPNEA: ICD-10-CM

## 2025-03-18 DIAGNOSIS — E03.9 HYPOTHYROIDISM, UNSPECIFIED TYPE: ICD-10-CM

## 2025-03-18 DIAGNOSIS — Z01.818 PREOP EXAMINATION: Primary | ICD-10-CM

## 2025-03-18 DIAGNOSIS — Z94.0 HISTORY OF KIDNEY TRANSPLANT: ICD-10-CM

## 2025-03-18 DIAGNOSIS — I51.7 RIGHT VENTRICULAR HYPERTROPHY: ICD-10-CM

## 2025-03-18 DIAGNOSIS — R79.89 ELEVATED TSH: ICD-10-CM

## 2025-03-18 DIAGNOSIS — N83.202 LEFT OVARIAN CYST: ICD-10-CM

## 2025-03-18 RX ORDER — FLUTICASONE PROPIONATE 50 MCG
2 SPRAY, SUSPENSION (ML) NASAL DAILY
COMMUNITY
Start: 2025-03-18

## 2025-03-18 RX ORDER — MECOBALAMIN 5000 MCG
15 TABLET,DISINTEGRATING ORAL PRN
COMMUNITY
Start: 2025-03-18

## 2025-03-18 ASSESSMENT — ENCOUNTER SYMPTOMS
SEIZURES: 1
ORTHOPNEA: 0

## 2025-03-18 ASSESSMENT — PAIN SCALES - GENERAL: PAINLEVEL_OUTOF10: NO PAIN (0)

## 2025-03-18 ASSESSMENT — LIFESTYLE VARIABLES: TOBACCO_USE: 1

## 2025-03-18 NOTE — H&P
Pre-Operative H & P     CC:  Preoperative exam to assess for increased cardiopulmonary risk while undergoing surgery and anesthesia.    Date of Encounter: 3/18/2025  Primary Care Physician:  Marielos Mercado     Reason for visit:   Encounter Diagnoses   Name Primary?    Preop examination Yes    Left ovarian cyst     Right ventricular hypertrophy     History of kidney transplant     Exertional dyspnea     Elevated TSH     Hypothyroidism, unspecified type        HPI  Lisbeth Umaña is a 49 year old female who presents for pre-operative H & P in preparation for  Procedure Information       Case: [id number] Date/Time: 04/04/25 1115    Procedures:       EXAM UNDER ANESTHESIA, PELVIS, robot assisted left salpingo-ophorectomy, right salpingectomy (Abdomen)      possible cystoscopy (Urethra)    Anesthesia type: General    Diagnosis: Left ovarian cyst [N83.202]    Pre-op diagnosis: Left ovarian cyst [N83.202]    [facility]: [facility]  / [facility]    Providers: Rachel Jones MD            Lisbeth Umaña is a 49 year old female with morbid obesity, hypothyroidism, GERD, CKD, and history of diabetes type 1 s/p kidney and pancreas transplant that has a left ovarian cyst.  A large left ovarian cyst was an incidental finding on routine imaging done for her transplant surveillance.  This was first identified in 2023, but surgery was never scheduled.  She then consulted with Dr. Jones this past fall due to becoming more symptomatic as the cyst has grown.  Surgery was scheduled in 12/2024, but was then cancelled/postponed due to an elevated TSH which has since been managed.  Now scheduled as above.     History is obtained from the patient and chart review    Hx of abnormal bleeding or anti-platelet use: none    Menstrual history: No LMP recorded. Patient is postmenopausal.:      Past Medical History  Past Medical History:   Diagnosis Date    Anemia in chronic renal disease     Chronic diarrhea     Chronic edema     Chronic  -- DO NOT REPLY / DO NOT REPLY ALL --  -- Message is from Engagement Center Operations ([facility]) --    Order Request  Lab: BLOOD WORK AND STOOL SAMPLE    Message / reason: THREE MONTH CHECK UP    Insurance type: HMO  Payor:  HUMAN MEDICARE ADVANTAGE / Plan:  BE /053 / Product Type:  MEDICARE ADVANTAGE    Preferred Delivery Method   Input in Epic CALL PATIENT WHEN ORDER IS READY     Caller Information       Type Contact Phone/Fax    07/11/2023 08:46 AM CDT Phone (Incoming) Zainab Kirkpatrick (Self) [phone] (H)          Alternative phone number: None    Can a detailed message be left? Yes    Message Turnaround:     [place]:    Please give this turnaround time to the caller:   \"This message will be sent to [state Provider's name]. The clinical team will fulfill your request as soon as they review your message.\"   kidney disease, stage 3 (H) 08/05/2021    Diabetes mellitus type 1 (H)     Diabetic gastroparesis (H)     Diabetic peripheral neuropathy (H)     Diabetic retinopathy of both eyes (H)     End stage renal disease (H)     Female stress incontinence     Fibrocystic breast disease     GERD (gastroesophageal reflux disease)     Hypertension, renal     Hypothyroidism due to Hashimoto's thyroiditis     Immunosuppression     Irritable bowel syndrome     Kidney replaced by transplant     Morbid obesity (H)     Pancreas replaced by transplant (H)     Recurrent UTI (urinary tract infection)     Secondary renal hyperparathyroidism     Seizure due to hypoglycemia (H)     Vitamin D deficiency        Past Surgical History  Past Surgical History:   Procedure Laterality Date    ADENOIDECTOMY      IR DIALYSIS FISTULOGRAM LEFT      KIDNEY AND PANCREAS COMBINED TRANSPLANTATION  11/07/2016    [facility]    SHOULDER SURGERY Right     WISDOM TOOTH EXTRACTION         Prior to Admission Medications  Current Outpatient Medications   Medication Sig Dispense Refill    calcium carbonate 750 MG CHEW Take 600 mg by mouth 2 times daily      cyclobenzaprine (FLEXERIL) 10 MG tablet Take 10 mg by mouth      D3-50 1.25 MG (23519 UT) capsule Take 1 capsule by mouth once a week. WEDNESDAY'S      docusate sodium (DSS) 100 MG capsule Take 100 mg by mouth as needed      fluticasone (FLONASE) 50 MCG/ACT nasal spray Spray 2 sprays into both nostrils daily.      furosemide (LASIX) 20 MG tablet Take 20 mg by mouth daily as needed      gabapentin (NEURONTIN) 800 MG tablet Take 800 mg by mouth 2 times daily      LANsoprazole (PREVACID) 15 MG DR capsule Take 1 capsule (15 mg) by mouth as needed.      levothyroxine (SYNTHROID/LEVOTHROID) 150 MCG tablet Take 150 mcg by mouth every morning (before breakfast).      lidocaine (XYLOCAINE) 2 % external gel as needed      mycophenolic acid (GENERIC EQUIVALENT) 180 MG EC tablet Take 3 tablets (540 mg) by  mouth 2 times daily. 180 tablet 11    omeprazole (PRILOSEC) 20 MG DR capsule Take 20 mg by mouth at bedtime.      ondansetron (ZOFRAN ODT) 4 MG ODT tab       predniSONE (DELTASONE) 5 MG tablet Take 1 tablet (5 mg) by mouth daily. (Patient taking differently: Take 5 mg by mouth every morning.) 30 tablet 11    sulfamethoxazole-trimethoprim (BACTRIM DS) 800-160 MG tablet Take 1 tablet by mouth as needed      tacrolimus (GENERIC EQUIVALENT) 1 MG capsule Take 3 capsules (3 mg) by mouth every morning AND 2 capsules (2 mg) every evening. 150 capsule 11    traZODone (DESYREL) 100 MG tablet Take 100 mg by mouth At Bedtime         Allergies  Allergies   Allergen Reactions    Iletin Ii Nph, Hives    Nitrofurantoin Hives, Itching and Nausea    Penicillins Hives    Levothyroxine Other (See Comments)     Patient states hair falls out, and nails break off, and she cannot take this. Only Synthroid.    Contrast Dye Nephrotoxicity and Other (See Comments)       Social History  Social History     Socioeconomic History    Marital status:      Spouse name: Not on file    Number of children: 0    Years of education: Not on file    Highest education level: Not on file   Occupational History    Occupation: on disability   Tobacco Use    Smoking status: Former    Smokeless tobacco: Never    Tobacco comments:     Quit 1997   Vaping Use    Vaping status: Never Used   Substance and Sexual Activity    Alcohol use: Not Currently     Alcohol/week: 0.0 - 1.0 standard drinks of alcohol    Drug use: Never    Sexual activity: Not on file   Other Topics Concern    Not on file   Social History Narrative    Not on file     Social Drivers of Health     Financial Resource Strain: High Risk (12/2/2024)    Received from [facility]    Overall Financial Resource Strain (CARDIA)     Difficulty of Paying Living Expenses: Very hard   Food Insecurity: Food Insecurity Present (12/2/2024)    Received from [facility]    Hunger Vital Sign     Worried About Running Out of Food in the Last Year: Sometimes true     Ran Out of Food in the Last Year: Not on file   Transportation Needs: Unmet Transportation Needs (12/2/2024)    Received from [facility]    Transportation Needs     In the past 12 months, has lack of transportation kept you from medical appointments, meetings, work, or from getting medicines or things needed for daily living?: Yes   Physical Activity: Insufficiently Active (12/2/2024)    Received from [facility]    Exercise Vital Sign     Days of Exercise per Week: 3 days     Minutes of Exercise per Session: 30 min   Stress: Stress Concern Present (12/2/2024)    Received from [facility]    [employer] - Occupational Stress Questionnaire     Feeling of Stress : To some extent   Social Connections: Moderately Integrated (12/2/2024)    Received from [facility]    Social Connection and Isolation Panel [NHANES]     Frequency of Communication with Friends and Family: Twice a week     Frequency of Social Gatherings with Friends and Family: Once a week     Attends Zoroastrian Services: 1 to 4 times per year     Active Member of Clubs or Organizations: No     Attends Club or Organization Meetings: Patient declined     Marital Status:    Interpersonal Safety: Not At Risk (1/6/2025)    Received from [facility]    Intimate Partner Violence     Are you in a relationship where you are physically hurt, threatened and/or made to feel afraid?: No   Housing Stability: Low Risk  (12/2/2024)    Received from [facility]    Housing Stability Vital Sign     Unable to Pay for Housing in the Last Year: No     Number of Times Moved in the Last Year: 0     Homeless in the Last Year: No       Family History  Family History   Problem Relation Age of Onset    Hypertension  "Mother     Diabetes Type 2  Mother     Kidney Disease Mother         Glucosuria    Gout Mother     Anesthesia Reaction Father         \"super sensitive\"    Hypertension Father     Diabetes Type 2  Father     Prostate Cancer Father     Sarcoidosis Father     Hypertension Brother     Liver Disease Brother     Breast Cancer Maternal Grandmother     Thrombosis No family hx of        Review of Systems  The complete review of systems is negative other than noted in the HPI or here.   Anesthesia Evaluation   Pt has had prior anesthetic.     No history of anesthetic complications       ROS/MED HX  ENT/Pulmonary: Comment: Chronic postnasal drip - chronic cough from postnasal drip    (+)     MATILDA risk factors, snores loudly,     allergic rhinitis,     tobacco use, Past use,                    (-) recent URI   Neurologic: Comment: Generalized headaches    (+)      migraines, seizures, last seizure: 2016 secondary to asymptomatic hypoglycemia,                        Cardiovascular: Comment: \"Enlarged right heart\"    Orthostatic hypotension    Lasix prn only for edema    (+)  - -   -  - -           PITTS.                      Previous cardiac testing   Echo: Date: Results:    Stress Test:  Date: Results:    ECG Reviewed:  Date: 8/2023 Results:  NSR  Cath:  Date: Results:   (-) orthopnea/PND   METS/Exercise Tolerance: 1 - Eating, dressing Comment: Doesn't exercise purposefully.  Can clean, go up and down stairs, go grocery shopping etc, but does get PITTS.    Denies exertional angina.    Hematologic:  - neg hematologic  ROS     Musculoskeletal: Comment: Chronic back pain, lumbar  - history of lumbar ablation procedures x 3    Chronic multiple joint pain      GI/Hepatic:     (+) GERD, Asymptomatic on medication,                  Renal/Genitourinary: Comment: S/p kidney pancreas transplant    Left ovarian  cyst    (+) renal disease, type: CRI, Pt does not require dialysis,  Pt has history of transplant,         Endo:     (+)          " thyroid problem, hypothyroidism, Chronic steroid usage for Post Transplant Immunosuppression. Date most recently used: daily. Obesity,       Psychiatric/Substance Use:     (+) psychiatric history anxiety (manages naturally)       Infectious Disease:  - neg infectious disease ROS     Malignancy:  - neg malignancy ROS     Other:  - neg other ROS    (+)  , H/O Chronic Pain,         Virtual visit -  No vitals were obtained    Physical Exam  Constitutional: Awake, alert, cooperative, no apparent distress, and appears stated age.  Eyes: Pupils equal  HENT: Normocephalic  Respiratory: non labored breathing   Neurologic: Awake, alert, oriented to name, place and time.   Neuropsychiatric: Calm, cooperative. Normal affect.      Prior Labs/Diagnostic Studies   All labs and imaging personally reviewed     EKG/ stress test - if available please see in ROS above     Component      Latest Ref Rng 1/6/2025  8:11 AM   WBC Count (External)      3.7 - 12.1 10(3)/uL 8.7 (E)   RBC Count (External)      3.76 - 5.39 10(6)/uL 4.26 (E)   Hemoglobin (External)      11.2 - 15.8 g/dL 13.2 (E)   Hematocrit (External)      33.9 - 47.5 % 39.3 (E)   MCV (External)      80.6 - 98.5 fL 92.2 (E)   MCH (External)      26.4 - 32.9 pg 30.9 (E)   MCHC (External)      32.0 - 36.0 g/dL 33.5 (E)   RDW (External)      10.0 - 16.8 % 14.1 (E)   Platelet Count (External)      179 - 450 10(3)/uL 224 (E)   % Neutrophils (External)      43.0 - 80.0 % 50.8 (E)   % Lymphocytes (External)      16.0 - 49.0 % 38.2 (E)   % Monocytes (External)      0.0 - 10.0 % 8.7 (E)   % Eosinophils (External)      0.0 - 7.0 % 1.9 (E)   % Basophils (External)      0.0 - 2.0 % 0.4 (E)   Absolute Neutrophils (External)      1.8 - 9.2 10(3)/uL 4.4 (E)   Absolute Lymphocytes (External)      1.2 - 3.9 10(3)/uL 3.3 (E)   Absolute Monocytes (External)      0.0 - 1.1 10(3)/uL 0.7 (E)   Absolute Eosinophils (External)      0.0 - 0.8 10(3)/uL 0.2 (E)   Absolute Basophils (External)      0.0 -  0.2 10(3)/uL 0.0 (E)   Sodium (External)      136 - 146 mmol/L 142 (E)   Potassium (External)      3.5 - 5.1 mmol/L 4.1 (E)   Chloride (External)      98 - 107 mmol/L 111 (H) (E)   CO2 (External)      22 - 29 mmol/L 23 (E)   Anion Gap (External)      10.0 - 20.0 mmol/L 12.1 (E)   Creatinine (External)      0.57 - 1.11 mg/dL 1.22 (H) (E)   Urea Nitrogen (External)      10.0 - 20.0 mg/dL 19.0 (E)   BUN/Creatinine Ratio (External)      11.70 - 22.90 ratio 15.57 (E)   Calcium (External)      8.6 - 10.5 mg/dL 9.1 (E)   Glucose (External)      70 - 100 mg/dL 81 (E)   GFR Estimated (External)      >=60 ml/min/1.73m2 55 (L) (E)      Legend:  (H) High  (L) Low  (E) External lab result    The patient's records and results personally reviewed by this provider.     Outside records reviewed from: Care Everywhere      Assessment    Lisbeth Umaña is a 49 year old female seen as a PAC referral for risk assessment and optimization for anesthesia.    Plan/Recommendations  Pt will be optimized for the proposed procedure.  See below for details on the assessment, risk, and preoperative recommendations    NEUROLOGY  - History of Seizure - none since 2016.     -Post Op delirium risk factors:  High co-morbid index    ENT  - No current airway concerns.  Will need to be reassessed day of surgery.  Mallampati: Unable to assess  TM: Unable to assess    CARDIAC  - No history of CAD, Hypertension, and Afib  - patient reports history of right heart enlargement, but last echo was >4 years ago at an OSH and I don't have access to those records.  +PITTS.  Updated echo ordered.  - hold lasix DOS    - METS (Metabolic Equivalents)  Patient CANNOT perform 4 METS exercise without symptoms             Total Score: 1    Functional Capacity: Unable to complete 4 METS      RCRI-Very low risk: Class 1 0.4% complication rate             Total Score: 0        PULMONARY    MATILDA Low Risk             Total Score: 2    MATILDA: Snores loudly    MATILDA: BMI over 35 kg/m2  "     - Denies asthma or inhaler use  - Tobacco History    History   Smoking Status    Former   Smokeless Tobacco    Never       GI  - GERD is well controlled with omeprazole and prn prevacid.  PONV High Risk  Total Score: 3           1 AN PONV: Pt is Female    1 AN PONV: Patient is not a current smoker    1 AN PONV: Intended Post Op Opioids        /RENAL/GYN  - CKD - updated labs ordered.  - s/p kidney/pancreas transplant in 2016 for DM1.  Continue all transplant meds without interruption.  - left ovarian cyst.  Surgery planned as above.     ENDOCRINE   - BMI: Estimated body mass index is 41.05 kg/m  as calculated from the following:    Height as of this encounter: 1.727 m (5' 8\").    Weight as of this encounter: 122.5 kg (270 lb).  Class 3 Obesity (BMI > 40)  - Thyroid disorder  Continue home replacement while hospitalized.  - recheck TSH ordered per patient request.  Last TSH in January was WNL    - s/p transplant as noted above for DM1    - on prednisone daily post transplant.  Stress dose steroids as per anesthesia.     HEME  VTE Low Risk 0.26%             Total Score: 1    VTE: BMI greater than 39      - No history of abnormal bleeding or antiplatelet use.      MSK  Patient is NOT Frail             Total Score: 0      - chronic back pain and chronic multiple joint pain.  Consider cautious positioning.     PSYCH  - no current anxiety meds.  Managing naturally.        Different anesthesia methods/types have been discussed with the patient, but they are aware that the final plan will be decided by the assigned anesthesia provider on the date of service.    The patient is not yet optimized for their procedure. Echocardiogram pending.     AVS with information on surgery time/arrival time, meds and NPO status given by nursing staff. No further diagnostic testing indicated.    Please refer to the physical examination documented by the anesthesiologist in the anesthesia record on the day of surgery.    Video-Visit " Details    Type of service:  Video Visit    Provider received verbal consent for a Video Visit from the patient? Yes   Video Start Time:  0757  Video End Time: 0813    Originating Location (pt. Location): Home    Distant Location (provider location):  Off-site  Mode of Communication:  Video Conference via [employer]Well  On the day of service:     Prep time: 15 minutes  Visit time: 16 minutes  Documentation time: 12 minutes  ------------------------------------------  Total time: 43 minutes      MASSIEL Lam CNP  Preoperative Assessment Center  [facility]  Clinic and Surgery Center  Phone: [phone]  Fax: [phone]

## 2025-03-18 NOTE — PATIENT INSTRUCTIONS
Preparing for Your Surgery      Name:  Lisbeth Umaña   MRN:  [id number]   :  1975   Today's Date:  3/18/2025       Arriving for surgery:  Surgery date:  25  Arrival time:  9:15 am  Surgery time: 11:15 am    Please come to:     Please come to:       [facility]    [address] [place]  [address]     The [facility] [place] Patient/Visitor Ramp is at [address]. Patients and visitors who self-park will receive the reduced hospital parking rate. If the Patient /Visitor Ramp is full, please follow the signs to the [place] car park located at the main hospital entrance.       parking is available (24 hours/ 7 days a week)      Discounted parking pass options are available for patients and visitors. They can be purchased at the [facility] desk at the main hospital entrance.     -    Stop at the security desk and they will direct surgery patients to the [place] [facility] [phone]        - If you need directions, a wheelchair or an escort please stop at the Information/security desk in the lobby.     What can I eat or drink?  -  You may eat and drink normally up to 8 hours prior to arrival time. (Until 1:15 am on 25)  -  You may have clear liquids until 2 hours prior to arrival time. (Until 7:15 am on 25)    Examples of clear liquids:  Water  Clear broth  Juices (apple, white grape, white cranberry  and cider) without pulp  Noncarbonated, powder based beverages  (lemonade and Kalia-Aid)  Sodas (Sprite, 7-Up, ginger ale and seltzer)  Coffee or tea (without milk or cream)  Gatorade    -  No Alcohol or cannabis products for at least 24 hours before surgery.     Which medicines can I take?    Hold Ibuprofen (Advil, Motrin) for 1 day(s) before surgery--unless otherwise directed by surgeon.  Hold Naproxen (Aleve) for 4 days before surgery.    -  DO NOT take  these medications the day of surgery:   Calcium carbonate   Docusate   No gels   Furosemide (Lasix)    -  PLEASE TAKE these medications the day of surgery:   Cyclobenzaprine (Flexeril) as needed   Flonase nasal spray as needed   Gabapentin (Neurontin)   Levothyroxine (Synthroid)   Mycophenolic acid   Prednisone    Bactrim as needed   Tacrolimus     How do I prepare myself?  - Please take 2 showers (one the night prior to surgery and one the morning of surgery) using Scrubcare or Hibiclens soap.    Use this soap only from the neck to your toes. Avoid genital area      Leave the soap on your skin for one minute--then rinse thoroughly.      You may use your own shampoo and conditioner. No other hair products.   - Please remove all jewelry and body piercings.  - No lotions, deodorants or fragrance.  - No makeup or fingernail polish.   - Bring your ID and insurance card.    -For patients being admitted to the [facility]  Family members are to take the patient belongings with them and place them in the lockers provided in the Family Lounge.  Please limit the items you bring to 1 bag as the lockers are small.      -If you use a CPAP machine, please bring the CPAP machine, tubing, and mask to hospital.    -If you have a Deep Brain Stimulator, Spinal Cord Stimulator, or any Neuro Stimulator device---you must bring the remote control to the hospital.      ALL PATIENTS GOING HOME THE SAME DAY OF SURGERY ARE REQUIRED TO HAVE A RESPONSIBLE ADULT TO DRIVE AND BE IN ATTENDANCE WITH THEM FOR 24 HOURS FOLLOWING SURGERY.    Covid testing policy as of 12/06/2022  Your surgeon will notify and schedule you for a COVID test if one is needed before surgery--please direct any questions or COVID symptoms to your surgeon      Questions or Concerns:    - For any questions regarding the day of surgery or your hospital stay, please contact the Pre Admission Nursing Office at [phone].       - If you have health changes between today  and your surgery, please call your surgeon.       - For questions after surgery, please call your surgeons office.           Current Visitor Guidelines    2 adult visitors for adult patients in the pre op area    If additional visitors come (beyond a patient care attendant or a group home caregiver), the additional visitors will be asked to wait in the main lobby of the hospital    Visiting hours: 8 a.m. to 8:30 p.m.    Patients confirmed or suspected to have symptoms of COVID 19 or flu:     No visitors allowed for adult patients.   Children (under age 18) can have 1 named visitor.     People who are sick or showing symptoms of COVID 19 or flu:    Are not allowed to visit patients--we can only make exceptions in special situations.       Please follow these guidelines for your visit:          Please maintain social distance          Masking is optional--however at times you may be asked to wear a mask for the safety of yourself and others     Clean your hands with alcohol hand . Do this when you arrive at and leave the building and patient room,    And again after you touch your mask or anything in the room.     Go directly to and from the room you are visiting.     Stay in the patient s room during your visit. Limit going to other places in the hospital as much as possible     Leave bags and jackets at home or in the car.     For everyone s health, please don t come and go during your visit. That includes for smoking   during your visit.

## 2025-03-18 NOTE — PROGRESS NOTES
Lisbeth is a 49 year old who is being evaluated via a billable video visit.    How would you like to obtain your AVS? MyChart  If the video visit is dropped, the invitation should be resent by: Text to cell phone: [phone]      Subjective   Lisbeth is a 49 year old, presenting for the following health issues:  Pre-Op Exam      HPI            Physical Exam

## 2025-03-20 ENCOUNTER — TELEPHONE (OUTPATIENT)
Dept: SURGERY | Facility: CLINIC | Age: 50
End: 2025-03-20
Payer: MEDICARE

## 2025-03-20 NOTE — TELEPHONE ENCOUNTER
Updated patient of her upcoming echo appointment on Monday, 3/24 at 3 pm at [facility] in [place].    Also confirmed lab orders from Marisol Chandra NP, had been received at [facility] lab; patient expressed understanding and planned to schedule a lab appointment.  Will follow up.  Lisbeth Umaña RN

## 2025-04-04 ENCOUNTER — ANESTHESIA (OUTPATIENT)
Dept: SURGERY | Facility: CLINIC | Age: 50
End: 2025-04-04
Payer: COMMERCIAL

## 2025-04-04 ENCOUNTER — HOSPITAL ENCOUNTER (OUTPATIENT)
Facility: CLINIC | Age: 50
Discharge: HOME | End: 2025-04-04
Attending: OBSTETRICS & GYNECOLOGY | Admitting: OBSTETRICS & GYNECOLOGY
Payer: COMMERCIAL

## 2025-04-04 VITALS
BODY MASS INDEX: 42.8 KG/M2 | OXYGEN SATURATION: 100 % | WEIGHT: 282.41 LBS | HEART RATE: 82 BPM | SYSTOLIC BLOOD PRESSURE: 150 MMHG | RESPIRATION RATE: 14 BRPM | HEIGHT: 68 IN | DIASTOLIC BLOOD PRESSURE: 74 MMHG | TEMPERATURE: 97.6 F

## 2025-04-04 DIAGNOSIS — G89.18 ACUTE POST-OPERATIVE PAIN: Primary | ICD-10-CM

## 2025-04-04 LAB
ABO + RH BLD: NORMAL
BLD GP AB SCN SERPL QL: NEGATIVE
GLUCOSE BLDC GLUCOMTR-MCNC: 112 MG/DL (ref 70–99)
SPECIMEN EXP DATE BLD: NORMAL

## 2025-04-04 PROCEDURE — 710N000010 HC RECOVERY PHASE 1, LEVEL 2, PER MIN: Performed by: OBSTETRICS & GYNECOLOGY

## 2025-04-04 PROCEDURE — 250N000009 HC RX 250: Performed by: OBSTETRICS & GYNECOLOGY

## 2025-04-04 PROCEDURE — 370N000017 HC ANESTHESIA TECHNICAL FEE, PER MIN: Performed by: OBSTETRICS & GYNECOLOGY

## 2025-04-04 PROCEDURE — 360N000080 HC SURGERY LEVEL 7, PER MIN: Performed by: OBSTETRICS & GYNECOLOGY

## 2025-04-04 PROCEDURE — 258N000003 HC RX IP 258 OP 636: Performed by: NURSE ANESTHETIST, CERTIFIED REGISTERED

## 2025-04-04 PROCEDURE — 88302 TISSUE EXAM BY PATHOLOGIST: CPT | Mod: 26 | Performed by: PATHOLOGY

## 2025-04-04 PROCEDURE — 86900 BLOOD TYPING SEROLOGIC ABO: CPT | Performed by: ANESTHESIOLOGY

## 2025-04-04 PROCEDURE — 710N000012 HC RECOVERY PHASE 2, PER MINUTE: Performed by: OBSTETRICS & GYNECOLOGY

## 2025-04-04 PROCEDURE — 250N000025 HC SEVOFLURANE, PER MIN: Performed by: OBSTETRICS & GYNECOLOGY

## 2025-04-04 PROCEDURE — 250N000013 HC RX MED GY IP 250 OP 250 PS 637

## 2025-04-04 PROCEDURE — 250N000011 HC RX IP 250 OP 636: Performed by: NURSE ANESTHETIST, CERTIFIED REGISTERED

## 2025-04-04 PROCEDURE — 250N000011 HC RX IP 250 OP 636: Mod: JW | Performed by: OBSTETRICS & GYNECOLOGY

## 2025-04-04 PROCEDURE — 88305 TISSUE EXAM BY PATHOLOGIST: CPT | Mod: 26 | Performed by: PATHOLOGY

## 2025-04-04 PROCEDURE — 250N000011 HC RX IP 250 OP 636: Mod: JZ | Performed by: NURSE ANESTHETIST, CERTIFIED REGISTERED

## 2025-04-04 PROCEDURE — 272N000001 HC OR GENERAL SUPPLY STERILE: Performed by: OBSTETRICS & GYNECOLOGY

## 2025-04-04 PROCEDURE — 999N000141 HC STATISTIC PRE-PROCEDURE NURSING ASSESSMENT: Performed by: OBSTETRICS & GYNECOLOGY

## 2025-04-04 PROCEDURE — 272N000002 HC OR SUPPLY OTHER OPNP: Performed by: OBSTETRICS & GYNECOLOGY

## 2025-04-04 PROCEDURE — 250N000011 HC RX IP 250 OP 636

## 2025-04-04 PROCEDURE — 250N000009 HC RX 250: Performed by: NURSE ANESTHETIST, CERTIFIED REGISTERED

## 2025-04-04 PROCEDURE — 86850 RBC ANTIBODY SCREEN: CPT | Performed by: ANESTHESIOLOGY

## 2025-04-04 PROCEDURE — 250N000011 HC RX IP 250 OP 636: Performed by: ANESTHESIOLOGY

## 2025-04-04 PROCEDURE — 58661 LAPAROSCOPY REMOVE ADNEXA: CPT | Mod: 50 | Performed by: OBSTETRICS & GYNECOLOGY

## 2025-04-04 PROCEDURE — 82962 GLUCOSE BLOOD TEST: CPT

## 2025-04-04 PROCEDURE — 250N000013 HC RX MED GY IP 250 OP 250 PS 637: Performed by: ANESTHESIOLOGY

## 2025-04-04 PROCEDURE — 88305 TISSUE EXAM BY PATHOLOGIST: CPT | Mod: TC | Performed by: OBSTETRICS & GYNECOLOGY

## 2025-04-04 PROCEDURE — 258N000003 HC RX IP 258 OP 636: Performed by: ANESTHESIOLOGY

## 2025-04-04 PROCEDURE — S2900 ROBOTIC SURGICAL SYSTEM: HCPCS | Mod: GC | Performed by: OBSTETRICS & GYNECOLOGY

## 2025-04-04 RX ORDER — ACETAMINOPHEN 325 MG/1
975 TABLET ORAL ONCE
Status: COMPLETED | OUTPATIENT
Start: 2025-04-04 | End: 2025-04-04

## 2025-04-04 RX ORDER — NALOXONE HYDROCHLORIDE 0.4 MG/ML
0.1 INJECTION, SOLUTION INTRAMUSCULAR; INTRAVENOUS; SUBCUTANEOUS
Status: DISCONTINUED | OUTPATIENT
Start: 2025-04-04 | End: 2025-04-04 | Stop reason: HOSPADM

## 2025-04-04 RX ORDER — DEXAMETHASONE SODIUM PHOSPHATE 4 MG/ML
4 INJECTION, SOLUTION INTRA-ARTICULAR; INTRALESIONAL; INTRAMUSCULAR; INTRAVENOUS; SOFT TISSUE
Status: CANCELLED | OUTPATIENT
Start: 2025-04-04

## 2025-04-04 RX ORDER — PROPOFOL 10 MG/ML
INJECTION, EMULSION INTRAVENOUS PRN
Status: DISCONTINUED | OUTPATIENT
Start: 2025-04-04 | End: 2025-04-04

## 2025-04-04 RX ORDER — OXYCODONE HYDROCHLORIDE 5 MG/1
5 TABLET ORAL
Status: DISCONTINUED | OUTPATIENT
Start: 2025-04-04 | End: 2025-04-04 | Stop reason: HOSPADM

## 2025-04-04 RX ORDER — DIPHENHYDRAMINE HYDROCHLORIDE 50 MG/ML
50 INJECTION, SOLUTION INTRAMUSCULAR; INTRAVENOUS ONCE
Status: COMPLETED | OUTPATIENT
Start: 2025-04-04 | End: 2025-04-04

## 2025-04-04 RX ORDER — HYDROXYZINE HYDROCHLORIDE 25 MG/1
25 TABLET, FILM COATED ORAL
Status: DISCONTINUED | OUTPATIENT
Start: 2025-04-04 | End: 2025-04-04 | Stop reason: HOSPADM

## 2025-04-04 RX ORDER — ONDANSETRON 2 MG/ML
4 INJECTION INTRAMUSCULAR; INTRAVENOUS EVERY 30 MIN PRN
Status: DISCONTINUED | OUTPATIENT
Start: 2025-04-04 | End: 2025-04-04 | Stop reason: HOSPADM

## 2025-04-04 RX ORDER — CYCLOBENZAPRINE HCL 10 MG
10 TABLET ORAL ONCE
Status: COMPLETED | OUTPATIENT
Start: 2025-04-04 | End: 2025-04-04

## 2025-04-04 RX ORDER — FENTANYL CITRATE 50 UG/ML
INJECTION, SOLUTION INTRAMUSCULAR; INTRAVENOUS PRN
Status: DISCONTINUED | OUTPATIENT
Start: 2025-04-04 | End: 2025-04-04

## 2025-04-04 RX ORDER — HYDROXYZINE HYDROCHLORIDE 10 MG/1
10 TABLET, FILM COATED ORAL
Status: DISCONTINUED | OUTPATIENT
Start: 2025-04-04 | End: 2025-04-04 | Stop reason: HOSPADM

## 2025-04-04 RX ORDER — LIDOCAINE HYDROCHLORIDE 20 MG/ML
INJECTION, SOLUTION INFILTRATION; PERINEURAL PRN
Status: DISCONTINUED | OUTPATIENT
Start: 2025-04-04 | End: 2025-04-04

## 2025-04-04 RX ORDER — SODIUM CHLORIDE, SODIUM LACTATE, POTASSIUM CHLORIDE, CALCIUM CHLORIDE 600; 310; 30; 20 MG/100ML; MG/100ML; MG/100ML; MG/100ML
INJECTION, SOLUTION INTRAVENOUS CONTINUOUS
Status: DISCONTINUED | OUTPATIENT
Start: 2025-04-04 | End: 2025-04-04 | Stop reason: HOSPADM

## 2025-04-04 RX ORDER — ONDANSETRON 2 MG/ML
INJECTION INTRAMUSCULAR; INTRAVENOUS PRN
Status: DISCONTINUED | OUTPATIENT
Start: 2025-04-04 | End: 2025-04-04

## 2025-04-04 RX ORDER — OXYCODONE HYDROCHLORIDE 10 MG/1
10 TABLET ORAL
Status: DISCONTINUED | OUTPATIENT
Start: 2025-04-04 | End: 2025-04-04 | Stop reason: HOSPADM

## 2025-04-04 RX ORDER — ONDANSETRON 4 MG/1
4 TABLET, ORALLY DISINTEGRATING ORAL EVERY 30 MIN PRN
Status: DISCONTINUED | OUTPATIENT
Start: 2025-04-04 | End: 2025-04-04 | Stop reason: HOSPADM

## 2025-04-04 RX ORDER — BUPIVACAINE HYDROCHLORIDE 2.5 MG/ML
INJECTION, SOLUTION EPIDURAL; INFILTRATION; INTRACAUDAL; PERINEURAL PRN
Status: DISCONTINUED | OUTPATIENT
Start: 2025-04-04 | End: 2025-04-04 | Stop reason: HOSPADM

## 2025-04-04 RX ORDER — MEPERIDINE HYDROCHLORIDE 25 MG/ML
25 INJECTION INTRAMUSCULAR; INTRAVENOUS; SUBCUTANEOUS ONCE
Status: COMPLETED | OUTPATIENT
Start: 2025-04-04 | End: 2025-04-04

## 2025-04-04 RX ORDER — DIPHENHYDRAMINE HCL 25 MG
25 CAPSULE ORAL ONCE
Status: COMPLETED | OUTPATIENT
Start: 2025-04-04 | End: 2025-04-04

## 2025-04-04 RX ORDER — HYDROMORPHONE HCL IN WATER/PF 6 MG/30 ML
0.2 PATIENT CONTROLLED ANALGESIA SYRINGE INTRAVENOUS EVERY 5 MIN PRN
Status: DISCONTINUED | OUTPATIENT
Start: 2025-04-04 | End: 2025-04-04 | Stop reason: HOSPADM

## 2025-04-04 RX ORDER — LIDOCAINE 40 MG/G
CREAM TOPICAL
Status: DISCONTINUED | OUTPATIENT
Start: 2025-04-04 | End: 2025-04-04 | Stop reason: HOSPADM

## 2025-04-04 RX ORDER — AMOXICILLIN 250 MG
1-2 CAPSULE ORAL 2 TIMES DAILY PRN
Qty: 30 TABLET | Refills: 0 | Status: SHIPPED | OUTPATIENT
Start: 2025-04-04

## 2025-04-04 RX ORDER — HYDROMORPHONE HCL IN WATER/PF 6 MG/30 ML
0.4 PATIENT CONTROLLED ANALGESIA SYRINGE INTRAVENOUS EVERY 5 MIN PRN
Status: DISCONTINUED | OUTPATIENT
Start: 2025-04-04 | End: 2025-04-04 | Stop reason: HOSPADM

## 2025-04-04 RX ORDER — ACETAMINOPHEN 325 MG/1
650-975 TABLET ORAL EVERY 6 HOURS PRN
COMMUNITY
Start: 2025-04-04

## 2025-04-04 RX ORDER — FENTANYL CITRATE 50 UG/ML
25 INJECTION, SOLUTION INTRAMUSCULAR; INTRAVENOUS EVERY 5 MIN PRN
Status: DISCONTINUED | OUTPATIENT
Start: 2025-04-04 | End: 2025-04-04 | Stop reason: HOSPADM

## 2025-04-04 RX ORDER — OXYCODONE HYDROCHLORIDE 5 MG/1
5 TABLET ORAL EVERY 6 HOURS PRN
Qty: 10 TABLET | Refills: 0 | Status: SHIPPED | OUTPATIENT
Start: 2025-04-04 | End: 2025-04-07

## 2025-04-04 RX ORDER — FENTANYL CITRATE 50 UG/ML
50 INJECTION, SOLUTION INTRAMUSCULAR; INTRAVENOUS EVERY 5 MIN PRN
Status: DISCONTINUED | OUTPATIENT
Start: 2025-04-04 | End: 2025-04-04 | Stop reason: HOSPADM

## 2025-04-04 RX ADMIN — LIDOCAINE HYDROCHLORIDE 100 MG: 20 INJECTION, SOLUTION INFILTRATION; PERINEURAL at 11:49

## 2025-04-04 RX ADMIN — FOSAPREPITANT 150 MG: 150 INJECTION, POWDER, LYOPHILIZED, FOR SOLUTION INTRAVENOUS at 12:05

## 2025-04-04 RX ADMIN — FENTANYL CITRATE 100 MCG: 50 INJECTION INTRAMUSCULAR; INTRAVENOUS at 11:50

## 2025-04-04 RX ADMIN — PHENYLEPHRINE HYDROCHLORIDE 100 MCG: 10 INJECTION INTRAVENOUS at 12:10

## 2025-04-04 RX ADMIN — HYDROMORPHONE HYDROCHLORIDE 0.5 MG: 1 INJECTION, SOLUTION INTRAMUSCULAR; INTRAVENOUS; SUBCUTANEOUS at 12:38

## 2025-04-04 RX ADMIN — SUCCINYLCHOLINE CHLORIDE 150 MG: 20 INJECTION, SOLUTION INTRAMUSCULAR; INTRAVENOUS; PARENTERAL at 11:50

## 2025-04-04 RX ADMIN — Medication 20 MG: at 13:01

## 2025-04-04 RX ADMIN — FENTANYL CITRATE 50 MCG: 50 INJECTION, SOLUTION INTRAMUSCULAR; INTRAVENOUS at 15:02

## 2025-04-04 RX ADMIN — PHENYLEPHRINE HYDROCHLORIDE 100 MCG: 10 INJECTION INTRAVENOUS at 12:03

## 2025-04-04 RX ADMIN — HYDROMORPHONE HYDROCHLORIDE 0.5 MG: 1 INJECTION, SOLUTION INTRAMUSCULAR; INTRAVENOUS; SUBCUTANEOUS at 14:19

## 2025-04-04 RX ADMIN — MEPERIDINE HYDROCHLORIDE 25 MG: 25 INJECTION INTRAMUSCULAR; INTRAVENOUS; SUBCUTANEOUS at 16:42

## 2025-04-04 RX ADMIN — SODIUM CHLORIDE, SODIUM LACTATE, POTASSIUM CHLORIDE, AND CALCIUM CHLORIDE: .6; .31; .03; .02 INJECTION, SOLUTION INTRAVENOUS at 11:34

## 2025-04-04 RX ADMIN — HYDROMORPHONE HYDROCHLORIDE 0.4 MG: 0.2 INJECTION, SOLUTION INTRAMUSCULAR; INTRAVENOUS; SUBCUTANEOUS at 15:16

## 2025-04-04 RX ADMIN — CYCLOBENZAPRINE 10 MG: 10 TABLET, FILM COATED ORAL at 11:07

## 2025-04-04 RX ADMIN — GABAPENTIN 800 MG: 300 CAPSULE ORAL at 10:29

## 2025-04-04 RX ADMIN — Medication 20 MG: at 12:35

## 2025-04-04 RX ADMIN — PHENYLEPHRINE HYDROCHLORIDE 100 MCG: 10 INJECTION INTRAVENOUS at 12:27

## 2025-04-04 RX ADMIN — SODIUM CHLORIDE, SODIUM LACTATE, POTASSIUM CHLORIDE, AND CALCIUM CHLORIDE: .6; .31; .03; .02 INJECTION, SOLUTION INTRAVENOUS at 13:15

## 2025-04-04 RX ADMIN — PROPOFOL 200 MG: 10 INJECTION, EMULSION INTRAVENOUS at 11:50

## 2025-04-04 RX ADMIN — ACETAMINOPHEN 975 MG: 325 TABLET, FILM COATED ORAL at 19:04

## 2025-04-04 RX ADMIN — MIDAZOLAM 2 MG: 1 INJECTION INTRAMUSCULAR; INTRAVENOUS at 11:30

## 2025-04-04 RX ADMIN — ONDANSETRON 4 MG: 4 TABLET, ORALLY DISINTEGRATING ORAL at 19:48

## 2025-04-04 RX ADMIN — Medication 80 MG: at 11:54

## 2025-04-04 RX ADMIN — FENTANYL CITRATE 50 MCG: 50 INJECTION, SOLUTION INTRAMUSCULAR; INTRAVENOUS at 14:45

## 2025-04-04 RX ADMIN — PHENYLEPHRINE HYDROCHLORIDE 100 MCG: 10 INJECTION INTRAVENOUS at 13:37

## 2025-04-04 RX ADMIN — Medication 200 MG: at 14:24

## 2025-04-04 RX ADMIN — PROCHLORPERAZINE EDISYLATE 5 MG: 5 INJECTION INTRAMUSCULAR; INTRAVENOUS at 15:07

## 2025-04-04 RX ADMIN — Medication 10 MG: at 13:52

## 2025-04-04 RX ADMIN — ACETAMINOPHEN 975 MG: 325 TABLET, FILM COATED ORAL at 10:29

## 2025-04-04 RX ADMIN — DIPHENHYDRAMINE HYDROCHLORIDE 50 MG: 50 INJECTION, SOLUTION INTRAMUSCULAR; INTRAVENOUS at 16:37

## 2025-04-04 RX ADMIN — ONDANSETRON 4 MG: 2 INJECTION INTRAMUSCULAR; INTRAVENOUS at 14:35

## 2025-04-04 RX ADMIN — ONDANSETRON 4 MG: 2 INJECTION INTRAMUSCULAR; INTRAVENOUS at 12:04

## 2025-04-04 ASSESSMENT — ACTIVITIES OF DAILY LIVING (ADL)
ADLS_ACUITY_SCORE: 38
ADLS_ACUITY_SCORE: 43
ADLS_ACUITY_SCORE: 38
ADLS_ACUITY_SCORE: 43
ADLS_ACUITY_SCORE: 38
ADLS_ACUITY_SCORE: 43
ADLS_ACUITY_SCORE: 38
ADLS_ACUITY_SCORE: 38

## 2025-04-04 NOTE — ANESTHESIA PROCEDURE NOTES
Airway       Patient location during procedure: OR       Procedure Start/Stop Times: 4/4/2025 11:51 AM and 4/4/2025 11:53 PM  Staff -        CRNA: Jamie Andre APRN CRNA       Performed By: CRNA  Consent for Airway        Urgency: elective  Indications and Patient Condition       Indications for airway management: ciara-procedural       Induction type:RSI       Mask difficulty assessment: 0 - not attempted    Final Airway Details       Final airway type: endotracheal airway       Successful airway: ETT - single  Endotracheal Airway Details        ETT size (mm): 7.0       Cuffed: yes       Successful intubation technique: video laryngoscopy       VL Blade Size: MAC 3       Grade View of Cords: 1       Adjucts: stylet       Position: Right       Measured from: gums/teeth       Secured at (cm): 23       Bite block used: None    Post intubation assessment        Placement verified by: capnometry, equal breath sounds and chest rise        Number of attempts at approach: 1       Number of other approaches attempted: 0       Secured with: tape       Ease of procedure: easy       Dentition: Intact and Unchanged    Medication(s) Administered   Medication Administration Time: 4/4/2025 11:51 AM

## 2025-04-04 NOTE — BRIEF OP NOTE
[facility]  Gynecology  Brief Operative Note      Name: Lisbeth Umaña  MRN: [id number]  : 1975  Date of surgery: 2025    Preoperative diagnosis:  - Left adnexal mass  - History of kidney and pancreas transplant  - T1DM  - HTN  - Hypothyroidism  - Secondary renal hyperparathyroid    Postoperative diagnosis:  - Same as above, s/p below stated procedures    Procedure(s):  - EUA, robotic-assisted, lysis of adhesions, left salpingo-oophorectomy, right salpingectomy    Surgeon: Rachel Jones MD  Resident: Jazlyn Nunes MD, PGY-3    Anesthesia: GETA, local  EBL: 100cc  Urine output: 100cc clear green urine at end of case  IV fluids: 1200cc crystalloid  Antibiotics:     Specimens:  ID Type Source Tests Collected by Time Destination   1 : left fallopian tube and ovary Tissue Ovary and Fallopian Tube, Left SURGICAL PATHOLOGY EXAM Rachel Jones MD 2025  1:50 PM    2 : right fallopian tube Tissue Fallopian Tube, Right SURGICAL PATHOLOGY EXAM Rachel Jones MD 2025  1:59 PM        Findings: EUA with external genitalia normal appearing, uterus small, mobile, cervix smooth without lesions or nodularity. Left abdominopelvic fullness, unable to appreciate whether adnexal mass or transplanted kidney. Laparoscopy revealed no entry injury. Moderately dense omental adhesions to anterior abdominal wall along prior vertical midline scar, taken down by Transplant Surgery. Transplant kidney present in left lower quadrant along the left lateral abdominal wall; dense tissue bridge from this kidney to the anterior abdominal wall wall was carefully explored, revealing the transplant ureter coursing upwards from the kidney to the anterior abdominal wall and then to the dome of the bladder, which was pulled anteriorly and superiorly from the usual position of the bladder. There is a simple-appearing ~8cm left ovarian cyst which was removed in entirety with the left ovary  and left fallopian tube. The right fallopian tube and ovary appeared normal, the right fallopian tube was removed in entirety. Uterus normal appearing. Cyst decompressed prior to removal from abdomen, with clear serous fluid appreciated in suction tubing. Otherwise, abdominal survey revealed grossly normal-appearing bowels, omentum, liver edge, stomach edge. Appendix not visualized. Surgical sites hemostatic at end of case. Full op note to follow.    Complications: None apparent    Jazlyn Nunes MD  Obstetrics & Gynecology, PGY-3  04/04/2025 2:20 PM

## 2025-04-04 NOTE — PROGRESS NOTES
Gynecology Postoperative Progress Note  4/4/2025    S: Patient reports she is doing well postoperatively. Pain is well controlled with medications. Ambulating without difficulty. Voiding spontaneously. Tolerating PO without nausea or vomiting. Denies chest pain, shortness of breath, dizziness, or other concerns at this time.     O:  Vitals:    04/04/25 1516 04/04/25 1530 04/04/25 1545 04/04/25 1600   BP: (!) 158/91 (!) 158/103 (!) 147/89 133/85   BP Location:       Pulse: 72 76 80 78   Resp: 12 16 16 11   Temp:       TempSrc:       SpO2: 99% 100% 99% 96%   Weight:       Height:       Gen: resting comfortably, sitting upright in recliner  Cardio: RR  Resp:  non-labored breathing  Abdomen: Soft, appropriately tender, mildly distended, laparoscopic incisions with overlying steris c/d/i  Extremities: Non-tender    A: 49 year old POD#0 s/p RA-lysis of adhesions, left salpingo-oophorectomy, right salpingectomy. Doing well in immediate postop period, meeting goals for discharge home.    Postop course:  - FEN: Advance as tolerated  - Pain: Adequately controlled with tylenol, oxycodone prn. Discharging with both tylenol and oxycodone. No ibuprofen or toradol d/t CKD.  - Heme: Hgb 13.4> EBL 100ml. No signs or symptoms of ongoing blood loss.  - GI: Discharging with senna-S  - : gloria out will discharge after voiding  - CV: BP mildly elevated, will resume PTA medications   - Resp: NI  - MSK: NI  - Neuro/Psych: NI  - Endocrine: T1DM - resume insulin regimen as usual  - PPx: S/p SCDs, discussed ambulation    Dispo: Home    Charles Saenz DO, MA  [facility]- PGY3  7:34 PM April 4, 2025

## 2025-04-04 NOTE — OP NOTE
[facility]  Full Operative Note    Date of Service:  4/4/2025    Surgeon: Rachel Jones MD  Assistants: MD Todd Hairston MD - Fellow  Jazlyn Nunes MD PGY-3    Preoperative diagnosis:  - Left adnexal mass  - History of kidney and pancreas transplant  - T1DM  - HTN  - Hypothyroidism  - Secondary renal hyperparathyroid     Postoperative diagnosis:  - Same as above, s/p below stated procedures     Procedure(s):  - EUA, robot-assisted lysis of adhesions, left salpingo-oophorectomy, right salpingectomy     Anesthesia: GETA, local  EBL: 100cc  Urine output: 100cc clear green urine at end of case  IV fluids: 1200cc crystalloid  Antibiotics: None  Complications: None     Specimens:   ID Type Source Tests Collected by Time Destination   1 : left fallopian tube and ovary Tissue Ovary and Fallopian Tube, Left SURGICAL PATHOLOGY EXAM Rachel Jones MD 4/4/2025  1:50 PM    2 : right fallopian tube Tissue Fallopian Tube, Right SURGICAL PATHOLOGY EXAM Rachel Jones MD 4/4/2025  1:59 PM      Indications: Lisbeth Umaña is a 49 year old female with a history of kidney and pancreas transplant, Type 1 DM,  who was referred to Gynecology after she was found to have a large left adnexal mass on routine transplant imaging. Given this, there was concern for the mass impacting the transplanted kidney and/or adjacent ureter. It was recommended she undergo Davinci assisted left salpingo-oophorectomy and right salpingectomy. Risks, benefits and alternatives to above stated procedure were discussed. Patient desired to proceed and written informed consent was obtained prior to proceeding.     Findings: EUA with external genitalia normal appearing, urinary incontinence on exam, uterus small, mobile, cervix smooth without lesions or nodularity. Left abdominopelvic fullness, unable to appreciate whether adnexal mass or transplanted kidney. Laparoscopy revealed no entry  injury. Moderately dense omental adhesions to anterior abdominal wall along prior vertical midline scar, taken down by Transplant Surgery. Transplant kidney present in left lower quadrant along the left lateral abdominal wall; a dense tissue bridge from this kidney to the anterior abdominal wall was carefully explored, revealing the transplant ureter coursing upwards from the kidney to the anterior abdominal wall and then to the dome of the bladder, which was pulled anteriorly and superiorly from the usual position of the bladder. There is a simple-appearing ~8cm left ovarian cyst which was removed in entirety with the left ovary and left fallopian tube. The right fallopian tube and ovary appeared normal; the right fallopian tube was removed in entirety. Uterus normal appearing. Cyst  was decompressed within the bag prior to removal from abdomen, with clear serous fluid appreciated in suction tubing. Otherwise, abdominal survey revealed grossly normal-appearing bowels, omentum, liver edge, stomach edge. Appendix not visualized. Surgical sites hemostatic at end of case.     Procedure Details:  Consent was reviewed with the patient in the preoperative setting and confirmed. The patient was transferred to the operating room where SCDs were placed. General anesthesia was obtained without noted difficulties and she was positioned in dorsal lithotomy position in what was felt to be a neurologically safe position with both arms tucked at sides. Exam under anesthesia was performed with findings as described above. The patient was then prepped and draped in the usual sterile fashion.   Timeout was called at which point the patient's name, procedure and operative site were confirmed by the operative team. A speculum was placed in the vagina. The anterior lip of the cervix was grasped and the cervix serially dilated, and the Ally uterine manipulator was placed. The speculum was removed and a gloria catheter placed.   Attention  was then turned to the upper abdomen after gloves were changed. Transplant Surgery then proceeded to enter the abdomen and place the laparoscopic ports. An 11 blade was used to make a 8mm incision in the left upper quadrant and the Veress needle introduced through the incision. The abdomen was insufflated with CO2 gas.  Pneumoperitoneum was achieved to a maxiumum pressure of 15mm Hg. The Veress needle was removed and the abdomen was entered with the 5mm laparoscope in the Visiport trocar. The abdomen was evaluated with no evidence of intraabdominal trauma from entry noted. Two additional 8 mm incisions were made in the left upper and left lower quadrants and Davinci ports placed. The entry site port was replaced with a 8mm AirSeal. With a Ligasure and a grasper, the Transplant Surgery performed lysis of adhesions, dividing the omentum away from the anterior abdominal wall and dissecting some of the tissue around the transplant ureter, which was appreciated to vermiculate freely. To better visualize the bladder, the bladder was backfilled with 240cc sterile saline stained with methylene blue. At this time there was adequate visualization of the rest of the abdomen, so two additional robotic ports were placed, one supraumbilically and one on the right abdomen. At this point, the Transplant Surgery team returned the case to OBGYN.  The Davinci camera was then inserted and the abdomen evaluated with no evidence of intraabdominal trauma from entry noted. At this point, the patient was placed into steep Trendelenburg. The pelvis was inspected as well as the upper abdomen with findings as noted above. The da Sandi robot was docked and instruments were inserted including fenestrated bipolar and monopolar scissors.  Attention was then turned to the right fallopian tube, which was grasped and elevated away from the pelvic sidewall. The underlying mesosalpinx was sequentially cauterized and cut from distal to proximal end of  the tube until reaching the cornua, where the tube was cauterized and transected, and removed from the abdomen in entirety.   Attention was then turned to the left adnexal mass. The infundibulopelvic ligament was identified and sequentially cauterized and cut, taking care to avoid placing tension or heat on/near the transplanted kidney and ureter. Once the infundibulopelvic ligament had been completely transected and site was noted to be hemostatic, the left tubal remnant was removed by grasping and elevating it, then sequentially cauterizing and cutting the underlying mesosalpinx until reaching the cornua, where the tubal remnant was transected and then removed from the abdomen. The AAVLifei robot was undocked.   Under laparoscopic visualization, a 10cm Endocatch bag was introduced into the abdomen and the left ovary and attached fallopian tube was placed in the Endocatch bag, which was then brought up to the anterior abdominal wall. Taking care to avoid spilling the cyst contents, the cyst was incised with cold laparoscopic chris, and the serous contents were suctioned out, decompressing the cyst.  The bag was brought to the anterior abdominal wall and with some manipulation the decompressed cyst was removed in entirety from the abdomen, in the bag.   The fascial defect from specimen removal was re-approximated with a single stitch of 0-vicryl suture, placed with assistance of a Dorene. Final survey of the abdomen revealed hemostatic surgical sites; intraabdominal pressure was decreased to < 5mmHg and surgical sites remained hemostatic.   All laparoscopic instruments and ports were then removed and the pneumoperitoneum was expelled. The patient was leveled and given positive pressure breaths. Skin was reapproximated with 4-0 vicryl sutures, and steristrips applied. The gloria catheter was removed. The uterine manipulator was removed. A small amount of cervical bleeding was made hemostatic with silver  nitrate. Surgical sites were hemostatic.  Sponge, instrument and needle counts were correct x 2. The patient tolerated the procedure well and was taken to PACU in stable condition. Dr. Jones was present for kowalski portions of the procedure.    Jazlyn Nunes MD  Obstetrics and Gyncology, PGY-3  April 4, 2025 , 2:35 PM      Staff:  I was scrubbed and present for entire case and agree w/ above note.  Rachel Jones MD      Addendum;   Dr. Vasquez was asked to assist with abdominal entry and port placement as well as Lysis of adhesions d/t that patient's known complexity including intraabdominal/pelvic kidney and prior abdominal surgery history. He was present for 25 min in order to complete that portion of the case.

## 2025-04-04 NOTE — ANESTHESIA PREPROCEDURE EVALUATION
Anesthesia Pre-Procedure Evaluation    Patient: Lisbeth Umaña   MRN: [id number] : 1975        Procedure : Procedure(s):  EXAM UNDER ANESTHESIA, PELVIS, robot assisted left salpingo-ophorectomy, right salpingectomy  possible cystoscopy          Past Medical History:   Diagnosis Date    Anemia in chronic renal disease     Chronic diarrhea     Chronic edema     Chronic kidney disease, stage 3 (H) 2021    Diabetes mellitus type 1 (H)     Diabetic gastroparesis (H)     Diabetic peripheral neuropathy (H)     Diabetic retinopathy of both eyes (H)     End stage renal disease (H)     Female stress incontinence     Fibrocystic breast disease     GERD (gastroesophageal reflux disease)     Hypertension, renal     Hypothyroidism due to Hashimoto's thyroiditis     Immunosuppression     Irritable bowel syndrome     Kidney replaced by transplant     Morbid obesity (H)     Pancreas replaced by transplant (H)     Recurrent UTI (urinary tract infection)     Secondary renal hyperparathyroidism     Seizure due to hypoglycemia (H)     Vitamin D deficiency       Past Surgical History:   Procedure Laterality Date    ADENOIDECTOMY      IR DIALYSIS FISTULOGRAM LEFT      KIDNEY AND PANCREAS COMBINED TRANSPLANTATION  2016    [facility]    SHOULDER SURGERY Right     WISDOM TOOTH EXTRACTION        Allergies   Allergen Reactions    Iletin Ii Nph, Hives    Nitrofurantoin Hives, Itching and Nausea    Penicillins Hives    Levothyroxine Other (See Comments)     Patient states hair falls out, and nails break off, and she cannot take this. Only Synthroid.    Contrast Dye Nephrotoxicity and Other (See Comments)      Social History     Tobacco Use    Smoking status: Former    Smokeless tobacco: Never    Tobacco comments:     Quit    Substance Use Topics    Alcohol use: Not Currently     Alcohol/week: 0.0 - 1.0 standard drinks of alcohol      Wt Readings from Last 1 Encounters:   25 128.1 kg (282 lb 6.6  "oz)        Anesthesia Evaluation   Pt has had prior anesthetic.     No history of anesthetic complications       ROS/MED HX  ENT/Pulmonary: Comment: Postnasal drip      Neurologic: Comment: Lumbar herniated discs      Cardiovascular: Comment: TTE 3/24/2025  LVEF 60-65%  Mild concentric LVH  RV nl      METS/Exercise Tolerance: 3 - Able to walk 1-2 blocks without stopping    Hematologic:       Musculoskeletal:       GI/Hepatic: Comment: Diabetic gastroparesis    (+) GERD,                   Renal/Genitourinary: Comment: L ovarian cyst    (+) renal disease (s/p KTP 2016),             Endo:     (+) type I DM (s/p KTP 2016),        Diabetic complications: nephropathy neuropathy gastroparesis.             Psychiatric/Substance Use:       Infectious Disease:       Malignancy:       Other:            Physical Exam    Airway        Mallampati: II   TM distance: > 3 FB   Neck ROM: full   Mouth opening: > 3 cm    Respiratory Devices and Support         Dental     Comment: Patient reports no loose or chipped teeth        Cardiovascular          Rhythm and rate: regular and normal     Pulmonary           breath sounds clear to auscultation           OUTSIDE LABS:  CBC:   Lab Results   Component Value Date    WBC 7.9 12/17/2024    HGB 12.7 12/17/2024    HCT 39.4 12/17/2024     12/17/2024     BMP:   Lab Results   Component Value Date    CHLORIDE 108 (H) 03/27/2025    CHLORIDE 111 (H) 01/06/2025     COAGS: No results found for: \"PTT\", \"INR\", \"FIBR\"  POC:   Lab Results   Component Value Date    HCG Negative 12/17/2024     HEPATIC: No results found for: \"ALBUMIN\", \"PROTTOTAL\", \"ALT\", \"AST\", \"GGT\", \"ALKPHOS\", \"BILITOTAL\", \"BILIDIRECT\", \"ELIUD\"  OTHER:   Lab Results   Component Value Date    TSH 90.20 (H) 12/17/2024       Anesthesia Plan    ASA Status:  3    NPO Status:  NPO Appropriate    Anesthesia Type: General.     - Airway: ETT   Induction: RSI.      Techniques and Equipment:     - Lines/Monitors: 2nd IV " "    Consents    Anesthesia Plan(s) and associated risks, benefits, and realistic alternatives discussed. Questions answered and patient/representative(s) expressed understanding.     - Discussed:     - Discussed with:  Patient      - Extended Intubation/Ventilatory Support Discussed: No.      - Patient is DNR/DNI Status: No     Use of blood products discussed: Yes.     - Discussed with: Patient.     - Consented: consented to blood products     Postoperative Care            Comments:    Other Comments: Very difficult IV. Discussed potential need for central line if vascular access lost intra-op    Also discussed arterial line placement if needed for significant bleeding intra-op.           Ethan Alston MD    Clinically Significant Risk Factors Present on Admission                             # Severe Obesity: Estimated body mass index is 42.94 kg/m  as calculated from the following:    Height as of this encounter: 1.727 m (5' 8\").    Weight as of this encounter: 128.1 kg (282 lb 6.6 oz).                "

## 2025-04-04 NOTE — DISCHARGE INSTRUCTIONS
Contacting your Doctor -   To contact a doctor, call:  [phone] and ask for the resident on call for Gynecology (answered 24 hours a day)   Emergency Department:  [facility]: [phone]  [facility]: [phone] 911 if you are in need of immediate or emergent help

## 2025-04-04 NOTE — ANESTHESIA POSTPROCEDURE EVALUATION
Patient: Lisbeth Umaña    Procedure: Procedure(s):  EXAM UNDER ANESTHESIA, PELVIS, robot assisted left salpingo-ophorectomy, right salpingectomy, lysis of adhesions       Anesthesia Type:  General    Note:  Disposition: Outpatient   Postop Pain Control: Challenging            Challenges/Interventions: Acute Pain; Multimodal therapy            Sign Out: Well controlled pain   PONV: Yes            Symptoms: Nausea only            Sign Out: PONV/POV resolved with treatment   Neuro/Psych: Uneventful            Sign Out: Acceptable/Baseline neuro status   Airway/Respiratory: Uneventful            Sign Out: Acceptable/Baseline resp. status   CV/Hemodynamics: Uneventful            Sign Out: Acceptable CV status; No obvious hypovolemia; No obvious fluid overload   Other NRE: NONE   DID A NON-ROUTINE EVENT OCCUR? No    Event details/Postop Comments:  Post-op shivering treated as well.            Last vitals:  Vitals Value Taken Time   /89 04/04/25 1545   Temp 36.3  C (97.4  F) 04/04/25 1440   Pulse 75 04/04/25 1555   Resp 12 04/04/25 1555   SpO2 97 % 04/04/25 1555   Vitals shown include unfiled device data.    Electronically Signed By: Kimberly Simons DO  April 4, 2025  3:55 PM  
96

## 2025-04-04 NOTE — ANESTHESIA CARE TRANSFER NOTE
Patient: Lisbeth Umaña    Procedure: Procedure(s):  EXAM UNDER ANESTHESIA, PELVIS, robot assisted left salpingo-ophorectomy, right salpingectomy, lysis of adhesions       Diagnosis: Left ovarian cyst [N83.202]  Diagnosis Additional Information: No value filed.    Anesthesia Type:   General     Note:    Oropharynx: oropharynx clear of all foreign objects and spontaneously breathing  Level of Consciousness: awake  Oxygen Supplementation: room air    Independent Airway: airway patency satisfactory and stable  Dentition: dentition unchanged  Vital Signs Stable: post-procedure vital signs reviewed and stable  Report to RN Given: handoff report given  Patient transferred to: PACU    Handoff Report: Identifed the Patient, Identified the Reponsible Provider, Reviewed the pertinent medical history, Discussed the surgical course, Reviewed Intra-OP anesthesia mangement and issues during anesthesia, Set expectations for post-procedure period and Allowed opportunity for questions and acknowledgement of understanding      Vitals:  Vitals Value Taken Time   /55 04/04/25 1440   Temp 36    Pulse 74 04/04/25 1441   Resp 15 04/04/25 1441   SpO2 91 % 04/04/25 1441   Vitals shown include unfiled device data.    Electronically Signed By: MASSIEL Burden CRNA  April 4, 2025  2:42 PM

## 2025-04-07 DIAGNOSIS — G89.18 ACUTE POST-OPERATIVE PAIN: ICD-10-CM

## 2025-04-07 RX ORDER — OXYCODONE HYDROCHLORIDE 5 MG/1
5 TABLET ORAL EVERY 6 HOURS PRN
Qty: 10 TABLET | Refills: 0 | Status: SHIPPED | OUTPATIENT
Start: 2025-04-07

## 2025-04-08 LAB
PATH REPORT.COMMENTS IMP SPEC: NORMAL
PATH REPORT.COMMENTS IMP SPEC: NORMAL
PATH REPORT.FINAL DX SPEC: NORMAL
PATH REPORT.GROSS SPEC: NORMAL
PATH REPORT.MICROSCOPIC SPEC OTHER STN: NORMAL
PATH REPORT.RELEVANT HX SPEC: NORMAL
PHOTO IMAGE: NORMAL

## 2025-04-17 PROCEDURE — 86140 C-REACTIVE PROTEIN: CPT | Performed by: INTERNAL MEDICINE

## 2025-04-17 PROCEDURE — 85025 COMPLETE CBC W/AUTO DIFF WBC: CPT | Performed by: INTERNAL MEDICINE

## 2025-04-17 PROCEDURE — 85652 RBC SED RATE AUTOMATED: CPT | Performed by: INTERNAL MEDICINE

## 2025-04-17 PROCEDURE — 82947 ASSAY GLUCOSE BLOOD QUANT: CPT | Performed by: INTERNAL MEDICINE

## 2025-04-17 PROCEDURE — 84155 ASSAY OF PROTEIN SERUM: CPT | Performed by: INTERNAL MEDICINE

## 2025-04-17 PROCEDURE — 84145 PROCALCITONIN (PCT): CPT | Performed by: INTERNAL MEDICINE

## 2025-04-17 PROCEDURE — 85610 PROTHROMBIN TIME: CPT | Performed by: INTERNAL MEDICINE

## 2025-04-18 ENCOUNTER — HOSPITAL ENCOUNTER (EMERGENCY)
Facility: CLINIC | Age: 50
Discharge: HOME | End: 2025-04-18
Attending: INTERNAL MEDICINE
Payer: COMMERCIAL

## 2025-04-18 ENCOUNTER — TELEPHONE (OUTPATIENT)
Dept: OBGYN | Facility: CLINIC | Age: 50
End: 2025-04-18

## 2025-04-18 ENCOUNTER — APPOINTMENT (OUTPATIENT)
Dept: CT IMAGING | Facility: CLINIC | Age: 50
End: 2025-04-18
Attending: INTERNAL MEDICINE
Payer: COMMERCIAL

## 2025-04-18 VITALS
HEIGHT: 68 IN | SYSTOLIC BLOOD PRESSURE: 107 MMHG | TEMPERATURE: 98.1 F | WEIGHT: 282 LBS | DIASTOLIC BLOOD PRESSURE: 72 MMHG | BODY MASS INDEX: 42.74 KG/M2 | OXYGEN SATURATION: 97 % | HEART RATE: 72 BPM | RESPIRATION RATE: 17 BRPM

## 2025-04-18 DIAGNOSIS — G89.18 ACUTE POST-OPERATIVE PAIN: ICD-10-CM

## 2025-04-18 DIAGNOSIS — T14.8XXA WOUND INFECTION: ICD-10-CM

## 2025-04-18 DIAGNOSIS — L08.9 WOUND INFECTION: ICD-10-CM

## 2025-04-18 LAB
ALBUMIN SERPL BCG-MCNC: 3.8 G/DL (ref 3.5–5.2)
ALBUMIN SERPL BCG-MCNC: 3.9 G/DL (ref 3.5–5.2)
ALBUMIN UR-MCNC: ABNORMAL MG/DL
ALP SERPL-CCNC: 100 U/L (ref 40–150)
ALP SERPL-CCNC: 103 U/L (ref 40–150)
ALT SERPL W P-5'-P-CCNC: 13 U/L (ref 0–50)
ALT SERPL W P-5'-P-CCNC: 14 U/L (ref 0–50)
ANION GAP SERPL CALCULATED.3IONS-SCNC: 9 MMOL/L (ref 7–15)
ANION GAP SERPL CALCULATED.3IONS-SCNC: 9 MMOL/L (ref 7–15)
APPEARANCE UR: ABNORMAL
AST SERPL W P-5'-P-CCNC: 19 U/L (ref 0–45)
AST SERPL W P-5'-P-CCNC: 28 U/L (ref 0–45)
BACTERIA #/AREA URNS HPF: ABNORMAL /HPF
BASE EXCESS BLDV CALC-SCNC: 0 MMOL/L (ref -3–3)
BASOPHILS # BLD AUTO: 0 10E3/UL (ref 0–0.2)
BASOPHILS # BLD AUTO: 0.1 10E3/UL (ref 0–0.2)
BASOPHILS NFR BLD AUTO: 1 %
BASOPHILS NFR BLD AUTO: 1 %
BILIRUB SERPL-MCNC: 0.2 MG/DL
BILIRUB SERPL-MCNC: 0.2 MG/DL
BILIRUB UR QL STRIP: NEGATIVE
BUN SERPL-MCNC: 14.7 MG/DL (ref 6–20)
BUN SERPL-MCNC: 14.9 MG/DL (ref 6–20)
CALCIUM SERPL-MCNC: 9.1 MG/DL (ref 8.8–10.4)
CALCIUM SERPL-MCNC: 9.2 MG/DL (ref 8.8–10.4)
CHLORIDE SERPL-SCNC: 107 MMOL/L (ref 98–107)
CHLORIDE SERPL-SCNC: 108 MMOL/L (ref 98–107)
COLOR UR AUTO: YELLOW
CREAT SERPL-MCNC: 1.16 MG/DL (ref 0.51–0.95)
CREAT SERPL-MCNC: 1.17 MG/DL (ref 0.51–0.95)
CRP SERPL-MCNC: 13.22 MG/L
CRP SERPL-MCNC: 13.24 MG/L
EGFRCR SERPLBLD CKD-EPI 2021: 57 ML/MIN/1.73M2
EGFRCR SERPLBLD CKD-EPI 2021: 58 ML/MIN/1.73M2
EOSINOPHIL # BLD AUTO: 0 10E3/UL (ref 0–0.7)
EOSINOPHIL # BLD AUTO: 0.1 10E3/UL (ref 0–0.7)
EOSINOPHIL NFR BLD AUTO: 0 %
EOSINOPHIL NFR BLD AUTO: 1 %
ERYTHROCYTE [DISTWIDTH] IN BLOOD BY AUTOMATED COUNT: 12.7 % (ref 10–15)
ERYTHROCYTE [DISTWIDTH] IN BLOOD BY AUTOMATED COUNT: 12.8 % (ref 10–15)
ERYTHROCYTE [SEDIMENTATION RATE] IN BLOOD BY WESTERGREN METHOD: 16 MM/HR (ref 0–20)
ERYTHROCYTE [SEDIMENTATION RATE] IN BLOOD BY WESTERGREN METHOD: 25 MM/HR (ref 0–20)
GLUCOSE SERPL-MCNC: 91 MG/DL (ref 70–99)
GLUCOSE SERPL-MCNC: 94 MG/DL (ref 70–99)
GLUCOSE UR STRIP-MCNC: NEGATIVE MG/DL
HCG UR QL: NEGATIVE
HCO3 BLDV-SCNC: 25 MMOL/L (ref 21–28)
HCO3 SERPL-SCNC: 24 MMOL/L (ref 22–29)
HCO3 SERPL-SCNC: 24 MMOL/L (ref 22–29)
HCT VFR BLD AUTO: 40.5 % (ref 35–47)
HCT VFR BLD AUTO: 40.6 % (ref 35–47)
HGB BLD-MCNC: 13.2 G/DL (ref 11.7–15.7)
HGB BLD-MCNC: 13.6 G/DL (ref 11.7–15.7)
HGB UR QL STRIP: NEGATIVE
HOLD SPECIMEN: NORMAL
HOLD SPECIMEN: NORMAL
IMM GRANULOCYTES # BLD: 0 10E3/UL
IMM GRANULOCYTES # BLD: 0 10E3/UL
IMM GRANULOCYTES NFR BLD: 1 %
IMM GRANULOCYTES NFR BLD: 1 %
INR PPP: 0.95 (ref 0.85–1.15)
INR PPP: 0.98 (ref 0.85–1.15)
KETONES UR STRIP-MCNC: NEGATIVE MG/DL
LACTATE BLD-SCNC: 0.9 MMOL/L (ref 0.7–2)
LEUKOCYTE ESTERASE UR QL STRIP: ABNORMAL
LYMPHOCYTES # BLD AUTO: 1.3 10E3/UL (ref 0.8–5.3)
LYMPHOCYTES # BLD AUTO: 1.4 10E3/UL (ref 0.8–5.3)
LYMPHOCYTES NFR BLD AUTO: 17 %
LYMPHOCYTES NFR BLD AUTO: 19 %
MCH RBC QN AUTO: 29.3 PG (ref 26.5–33)
MCH RBC QN AUTO: 30.2 PG (ref 26.5–33)
MCHC RBC AUTO-ENTMCNC: 32.5 G/DL (ref 31.5–36.5)
MCHC RBC AUTO-ENTMCNC: 33.6 G/DL (ref 31.5–36.5)
MCV RBC AUTO: 90 FL (ref 78–100)
MCV RBC AUTO: 90 FL (ref 78–100)
MONOCYTES # BLD AUTO: 0.5 10E3/UL (ref 0–1.3)
MONOCYTES # BLD AUTO: 0.5 10E3/UL (ref 0–1.3)
MONOCYTES NFR BLD AUTO: 7 %
MONOCYTES NFR BLD AUTO: 7 %
MUCOUS THREADS #/AREA URNS LPF: PRESENT /LPF
NEUTROPHILS # BLD AUTO: 5.4 10E3/UL (ref 1.6–8.3)
NEUTROPHILS # BLD AUTO: 5.8 10E3/UL (ref 1.6–8.3)
NEUTROPHILS NFR BLD AUTO: 73 %
NEUTROPHILS NFR BLD AUTO: 74 %
NITRATE UR QL: NEGATIVE
NRBC # BLD AUTO: 0 10E3/UL
NRBC # BLD AUTO: 0 10E3/UL
NRBC BLD AUTO-RTO: 0 /100
NRBC BLD AUTO-RTO: 0 /100
PCO2 BLDV: 40 MM HG (ref 40–50)
PH BLDV: 7.4 [PH] (ref 7.32–7.43)
PH UR STRIP: 7 [PH] (ref 5–7)
PLATELET # BLD AUTO: 238 10E3/UL (ref 150–450)
PLATELET # BLD AUTO: 251 10E3/UL (ref 150–450)
PO2 BLDV: 47 MM HG (ref 25–47)
POTASSIUM SERPL-SCNC: 4.6 MMOL/L (ref 3.4–5.3)
POTASSIUM SERPL-SCNC: 4.7 MMOL/L (ref 3.4–5.3)
PROCALCITONIN SERPL IA-MCNC: 0.04 NG/ML
PROCALCITONIN SERPL IA-MCNC: 0.05 NG/ML
PROT SERPL-MCNC: 7.3 G/DL (ref 6.4–8.3)
PROT SERPL-MCNC: 7.4 G/DL (ref 6.4–8.3)
RBC # BLD AUTO: 4.5 10E6/UL (ref 3.8–5.2)
RBC # BLD AUTO: 4.51 10E6/UL (ref 3.8–5.2)
RBC URINE: 5 /HPF
SAO2 % BLDV: 83 % (ref 70–75)
SODIUM SERPL-SCNC: 140 MMOL/L (ref 135–145)
SODIUM SERPL-SCNC: 141 MMOL/L (ref 135–145)
SP GR UR STRIP: 1.02 (ref 1–1.03)
SQUAMOUS EPITHELIAL: 5 /HPF
TRANSITIONAL EPI: 1 /HPF
UROBILINOGEN UR STRIP-MCNC: 1 MG/DL
WBC # BLD AUTO: 7.4 10E3/UL (ref 4–11)
WBC # BLD AUTO: 7.8 10E3/UL (ref 4–11)
WBC URINE: 161 /HPF

## 2025-04-18 PROCEDURE — 99285 EMERGENCY DEPT VISIT HI MDM: CPT | Mod: 25 | Performed by: INTERNAL MEDICINE

## 2025-04-18 PROCEDURE — 85652 RBC SED RATE AUTOMATED: CPT | Performed by: INTERNAL MEDICINE

## 2025-04-18 PROCEDURE — 87186 SC STD MICRODIL/AGAR DIL: CPT | Performed by: INTERNAL MEDICINE

## 2025-04-18 PROCEDURE — 74177 CT ABD & PELVIS W/CONTRAST: CPT

## 2025-04-18 PROCEDURE — 99285 EMERGENCY DEPT VISIT HI MDM: CPT | Performed by: INTERNAL MEDICINE

## 2025-04-18 PROCEDURE — 85610 PROTHROMBIN TIME: CPT | Performed by: INTERNAL MEDICINE

## 2025-04-18 PROCEDURE — 36415 COLL VENOUS BLD VENIPUNCTURE: CPT | Performed by: INTERNAL MEDICINE

## 2025-04-18 PROCEDURE — 80048 BASIC METABOLIC PNL TOTAL CA: CPT | Performed by: INTERNAL MEDICINE

## 2025-04-18 PROCEDURE — 250N000009 HC RX 250: Performed by: INTERNAL MEDICINE

## 2025-04-18 PROCEDURE — 85004 AUTOMATED DIFF WBC COUNT: CPT | Performed by: INTERNAL MEDICINE

## 2025-04-18 PROCEDURE — 81003 URINALYSIS AUTO W/O SCOPE: CPT | Performed by: INTERNAL MEDICINE

## 2025-04-18 PROCEDURE — 83605 ASSAY OF LACTIC ACID: CPT

## 2025-04-18 PROCEDURE — 82803 BLOOD GASES ANY COMBINATION: CPT

## 2025-04-18 PROCEDURE — 250N000011 HC RX IP 250 OP 636: Performed by: INTERNAL MEDICINE

## 2025-04-18 PROCEDURE — 82435 ASSAY OF BLOOD CHLORIDE: CPT | Performed by: INTERNAL MEDICINE

## 2025-04-18 PROCEDURE — 81025 URINE PREGNANCY TEST: CPT | Performed by: INTERNAL MEDICINE

## 2025-04-18 PROCEDURE — 250N000011 HC RX IP 250 OP 636: Mod: JZ | Performed by: INTERNAL MEDICINE

## 2025-04-18 PROCEDURE — 96374 THER/PROPH/DIAG INJ IV PUSH: CPT | Mod: 59 | Performed by: INTERNAL MEDICINE

## 2025-04-18 PROCEDURE — 84145 PROCALCITONIN (PCT): CPT | Performed by: INTERNAL MEDICINE

## 2025-04-18 PROCEDURE — 81001 URINALYSIS AUTO W/SCOPE: CPT | Performed by: INTERNAL MEDICINE

## 2025-04-18 PROCEDURE — 86140 C-REACTIVE PROTEIN: CPT | Performed by: INTERNAL MEDICINE

## 2025-04-18 RX ORDER — CEPHALEXIN 500 MG/1
500 CAPSULE ORAL 4 TIMES DAILY
Qty: 28 CAPSULE | Refills: 0 | Status: SHIPPED | OUTPATIENT
Start: 2025-04-18 | End: 2025-04-25

## 2025-04-18 RX ORDER — IOPAMIDOL 755 MG/ML
100 INJECTION, SOLUTION INTRAVASCULAR ONCE
Status: COMPLETED | OUTPATIENT
Start: 2025-04-18 | End: 2025-04-18

## 2025-04-18 RX ORDER — HYDROMORPHONE HYDROCHLORIDE 1 MG/ML
.5-1 INJECTION, SOLUTION INTRAMUSCULAR; INTRAVENOUS; SUBCUTANEOUS ONCE
Status: COMPLETED | OUTPATIENT
Start: 2025-04-18 | End: 2025-04-18

## 2025-04-18 RX ADMIN — SODIUM CHLORIDE 70 ML: 9 INJECTION, SOLUTION INTRAVENOUS at 20:49

## 2025-04-18 RX ADMIN — HYDROMORPHONE HYDROCHLORIDE 0.5 MG: 1 INJECTION, SOLUTION INTRAMUSCULAR; INTRAVENOUS; SUBCUTANEOUS at 22:09

## 2025-04-18 RX ADMIN — IOPAMIDOL 138 ML: 755 INJECTION, SOLUTION INTRAVENOUS at 20:48

## 2025-04-18 ASSESSMENT — ACTIVITIES OF DAILY LIVING (ADL)
ADLS_ACUITY_SCORE: 45

## 2025-04-18 ASSESSMENT — COLUMBIA-SUICIDE SEVERITY RATING SCALE - C-SSRS
6. HAVE YOU EVER DONE ANYTHING, STARTED TO DO ANYTHING, OR PREPARED TO DO ANYTHING TO END YOUR LIFE?: NO
2. HAVE YOU ACTUALLY HAD ANY THOUGHTS OF KILLING YOURSELF IN THE PAST MONTH?: NO
1. IN THE PAST MONTH, HAVE YOU WISHED YOU WERE DEAD OR WISHED YOU COULD GO TO SLEEP AND NOT WAKE UP?: NO

## 2025-04-18 NOTE — PROGRESS NOTES
Gynecology Consult Note    Consulting Service: ED  Reason for Consult: Concern for surgical site infection    HPI:   Pt has significant PMH for history of kidney and pancreas transplant, Type 1 DM. She is POD#14 from robot-assisted lysis of adhesions, left salpingo-oophorectomy, right salpingectomy with OBGYN and assistance entering abdomen from transplant surgery. Pathology from surgery was benign. Her pain initially improved after surgery, but has noticed continued pain at one of her left incisions - left lateral to the umbilicus - that has worsened over the last several days. Pain used to be initially with bowel movements, but now is constant where she has to brace it to prevent pain. Describes pain as sharp. She has been taking Tylenol and oxycodone for pain, which doesn't provide much relief so she stopped the oxycodone. She is unsure if there is erythema or heat from the incision, but started noticing syrup-consistency grey/yellow drainage last night. Steri-strips are still in place over incisions. She has been afebrile. All other incision sites are healing appropriately. She has intermittent mild nausea, no vomiting. Normal, regular bowel movements, no change in urinary habits or dysuria.    Patient lives in [place] and was worried about an infection quickly spreading over the weekend therefore called to discuss with team. It was recommended she present to the ED for further evaluation.    She does report her blood sugars have been very well controlled.      ROS:   Negative except as per HPI    PMH:   Past Medical History:   Diagnosis Date    Anemia in chronic renal disease     Chronic diarrhea     Chronic edema     Chronic kidney disease, stage 3 (H) 08/05/2021    Diabetes mellitus type 1 (H)     Diabetic gastroparesis (H)     Diabetic peripheral neuropathy (H)     Diabetic retinopathy of both eyes (H)     End stage renal disease (H)     Female stress incontinence     Fibrocystic breast disease     GERD  (gastroesophageal reflux disease)     Hypertension, renal     Hypothyroidism due to Hashimoto's thyroiditis     Immunosuppression     Irritable bowel syndrome     Kidney replaced by transplant     Morbid obesity (H)     Pancreas replaced by transplant (H)     Recurrent UTI (urinary tract infection)     Secondary renal hyperparathyroidism     Seizure due to hypoglycemia (H)     Vitamin D deficiency      PSHx:   Past Surgical History:   Procedure Laterality Date    ADENOIDECTOMY      DAVINCI SALPINGO-OOPHORECTOMY INCLUDING BILATERAL Left 4/4/2025    Procedure: EXAM UNDER ANESTHESIA, PELVIS, robot assisted left salpingo-ophorectomy, right salpingectomy, lysis of adhesions;  Surgeon: Rachel Jones MD;  Location: [facility] OR    IR DIALYSIS FISTULOGRAM LEFT      KIDNEY AND PANCREAS COMBINED TRANSPLANTATION  11/07/2016    [facility]    SHOULDER SURGERY Right     WISDOM TOOTH EXTRACTION         Medications:   Current Outpatient Medications   Medication Instructions    acetaminophen (TYLENOL) 650-975 mg, Oral, EVERY 6 HOURS PRN    calcium carbonate 600 mg, 2 TIMES DAILY    cyclobenzaprine (FLEXERIL) 10 mg    D3-50 1.25 MG (50085 UT) capsule 1 capsule, WEEKLY    docusate sodium (COLACE) 100 mg, PRN    fluticasone (FLONASE) 50 MCG/ACT nasal spray 2 sprays, DAILY    furosemide (LASIX) 20 mg, DAILY PRN    gabapentin (NEURONTIN) 800 mg, 2 TIMES DAILY    LANsoprazole (PREVACID) 15 mg, PRN    levothyroxine (SYNTHROID/LEVOTHROID) 150 mcg, EVERY MORNING BEFORE BREAKFAST    lidocaine (XYLOCAINE) 2 % external gel PRN    mycophenolic acid (GENERIC EQUIVALENT) 540 mg, Oral, 2 TIMES DAILY    omeprazole (PRILOSEC) 20 mg, AT BEDTIME    ondansetron (ZOFRAN ODT) 4 MG ODT tab     oxyCODONE (ROXICODONE) 5 mg, Oral, EVERY 6 HOURS PRN    predniSONE (DELTASONE) 5 mg, Oral, DAILY    senna-docusate (SENOKOT-S/PERICOLACE) 8.6-50 MG tablet 1-2 tablets, Oral, 2 TIMES DAILY PRN    sulfamethoxazole-trimethoprim (BACTRIM DS) 800-160  "MG tablet 1 tablet, PRN    tacrolimus (GENERIC EQUIVALENT) 1 MG capsule Take 3 capsules (3 mg) by mouth every morning AND 2 capsules (2 mg) every evening.    traZODone (DESYREL) 100 mg, AT BEDTIME     Allergies:   Allergies   Allergen Reactions    Iletin Ii Nph, Hives    Nitrofurantoin Hives, Itching and Nausea    Penicillins Hives    Levothyroxine Other (See Comments)     Patient states hair falls out, and nails break off, and she cannot take this. Only Synthroid.    Contrast Dye Nephrotoxicity and Other (See Comments)     Social History:   Social History     Tobacco Use    Smoking status: Former    Smokeless tobacco: Never    Tobacco comments:     Quit 1997   Vaping Use    Vaping status: Never Used   Substance Use Topics    Alcohol use: Not Currently     Alcohol/week: 0.0 - 1.0 standard drinks of alcohol    Drug use: Never     Physical Exam:   /72   Pulse 72   Temp 98.1  F (36.7  C) (Oral)   Resp 17   Ht 1.727 m (5' 8\")   Wt 127.9 kg (282 lb)   SpO2 97%   BMI 42.88 kg/m     Gen:  Resting comfortably, NAD  CV:  Well perfused  Pulm:  NWOB on room air  Abd:  Soft, non-distended, non-tender in all regions not adjacent to incision left of the umbilicus  Inc: Incision left of the umbilicus tender with purulent drainage on steris, which were removed. Incision and adjacent area quite tender to palpation, limiting exam. After receiving 0.5mg IV dilaudid, incision examined more thoroughly, revealing skin overall well approximated with a very small step off from superior to inferior edge, and very small area of separation of the skin at each apex. Incision probed with sterile q-tips and unable to probe more than 5mm from the surface of the skin in all directions; no tracking or tunneling discovered and minimal serosanguinous discharge appreciated on exam. Incision was cleaned, fresh steri strips were placed, and the incision was dressed with a 2x2 and tegaderm. All other laparoscopic incisions - one on R " abdomen, one supraumbilical, one left lateral, and a small stab incision at Simmons's point - with steri strips still in place, which were removed, revealing skin well-approximated with no adjacent erythema, edema, tenderness or warmth.    Labs:   Latest Reference Range & Units 04/18/25 16:10 04/18/25 17:39 04/18/25 18:52   Sodium 135 - 145 mmol/L 140     Potassium 3.4 - 5.3 mmol/L 4.6     Chloride 98 - 107 mmol/L 107     Carbon Dioxide (CO2) 22 - 29 mmol/L 24     Urea Nitrogen 6.0 - 20.0 mg/dL 14.9     Creatinine 0.51 - 0.95 mg/dL 1.16 (H)     GFR Estimate >60 mL/min/1.73m2 58 (L)     Calcium 8.8 - 10.4 mg/dL 9.2     Anion Gap 7 - 15 mmol/L 9     Albumin 3.5 - 5.2 g/dL 3.9     Protein Total 6.4 - 8.3 g/dL 7.4     Alkaline Phosphatase 40 - 150 U/L 103     ALT 0 - 50 U/L 14     AST 0 - 45 U/L 28     Bilirubin Total <=1.2 mg/dL 0.2     CRP Inflammation <5.00 mg/L 13.24 (H)     Glucose 70 - 99 mg/dL 91     Lactic Acid POCT 0.7 - 2.0 mmol/L  0.9    Procalcitonin <0.50 ng/mL 0.04     pH Venous POCT 7.32 - 7.43   7.40    Base Excess/Deficit (+/-) POCT -3.0 - 3.0 mmol/L  0.0    pCO2 Venous POCT 40 - 50 mm Hg  40    pO2 Venous POCT 25 - 47 mm Hg  47    O2 Sat, Venous POCT 70 - 75 %  83 (H)    Bicarbonate Venous POCT 21 - 28 mmol/L  25    WBC 4.0 - 11.0 10e3/uL   7.4   Hemoglobin 11.7 - 15.7 g/dL   13.6   Hematocrit 35.0 - 47.0 %   40.5   Platelet Count 150 - 450 10e3/uL   238   RBC Count 3.80 - 5.20 10e6/uL   4.51   MCV 78 - 100 fL   90   MCH 26.5 - 33.0 pg   30.2   MCHC 31.5 - 36.5 g/dL   33.6   RDW 10.0 - 15.0 %   12.7   % Neutrophils %   73   % Lymphocytes %   19   % Monocytes %   7   % Eosinophils %   0   % Basophils %   1   % Immature Granulocytes %   1   Absolute Basophils 0.0 - 0.2 10e3/uL   0.1   Absolute Eosinophils 0.0 - 0.7 10e3/uL   0.0   Absolute Immature Granulocytes <=0.4 10e3/uL   0.0   Absolute Lymphocytes 0.8 - 5.3 10e3/uL   1.4   Absolute Monocytes 0.0 - 1.3 10e3/uL   0.5   Absolute Neutrophils 1.6 - 8.3  10e3/uL   5.4   Absolute NRBCs 10e3/uL   0.0   NRBCs per 100 WBC <1 /100   0   Sed Rate 0 - 20 mm/hr   25 (H)   INR 0.85 - 1.15    0.98     CT abd/pelvis 25:  FINDINGS:   LOWER CHEST: Normal.  HEPATOBILIARY: The lung bases are clear.  PANCREAS: Normal.  SPLEEN: Normal.  ADRENAL GLANDS: Normal.  KIDNEYS/BLADDER: Marked atrophy of both kidneys.  BOWEL: Normal.  LYMPH NODES: Normal.  VASCULATURE: Normal.  PELVIC ORGANS: Left lower quadrant renal transplant. No pelvic fluid.  MUSCULOSKELETAL: Presumed track from a laparoscopic probe left lower abdominal wall. No abscess.           IMPRESSION:   1.  No soft tissue abscess or fluid.  2.  Marked atrophy of the native kidneys.  3.  Left lower quadrant renal transplant.    A&P: Lisbeth Umaña is a 49 year old  with PMH significant for T1DM, s/p kidney and pancreas transplant 2016, [facility]  [facility] for large L. Adnexal mass with concern for proximity of cyst impacting transplanted organ. Transplant surgery team was present for entering the abdomen and  placing laparoscopic ports to assure safety and integrity of transplanted pancreas and kidney. Pathology from surgery was benign. Now with concern for abnormal drainage and worsening pain from L lateral abdominal port site with concern for surgical site infection.     Surgical site infection  Superficial cellulitis  Laparoscopic incision to the left of midline evaluated for dehiscence, tunneling/tracking, underlying abscess, seroma or hematoma, or other etiology that may be contributing to worsening pain and new onset discharge. This incision does appear mildly infected with mild adjacent erythema and warmth and evidence of prior purulent drainage. Minimal drainage on exam, unable to probe more than 5mm from surface of incision in all directions therefore no tunneling or tracking appreciated. CT without evidence of abscess, fascial dehiscence, or hernia.   - incision cleaned, dried, and  re-dressed with 4x4 and tegaderm  - recommended removing and replacing dressing if 4x4 appears dirtied or saturated or dressing starts to peel off. Okay to shower. If minimal ongoing drainage okay to remove dressing and leave open to air. Remove new steri strips within 5-7 days.  - discharged with 7 day course of bactrim for superficial cellulitis; patient reports that bactrim has worked well for her in the past with minor infections and for prophylaxis in and around time of surgery.  - message sent to clinic for follow-up within 1 week    Thank you for this consult.  Please do not hesitate to contact us with concerns or questions.       Patient discussed with Dr. Altman.    Jazlyn Nunes MD  Obstetrics & Gynecology, PGY-3  04/19/2025 1:21 AM

## 2025-04-18 NOTE — ED TRIAGE NOTES
Laparoscopic site near umbilicus draining and told to come to the ER       Triage Assessment (Adult)       Row Name 04/18/25 4801          Triage Assessment    Airway WDL WDL        Respiratory WDL    Respiratory WDL WDL        Cardiac WDL    Cardiac WDL WDL        Cognitive/Neuro/Behavioral WDL    Cognitive/Neuro/Behavioral WDL WDL

## 2025-04-18 NOTE — ED PROVIDER NOTES
[facility] EMERGENCY DEPARTMENT ([facility])    4/18/25      ED PROVIDER NOTE   ED 2  History     Chief Complaint   Patient presents with    Post-op Problem     HPI  Lisbeth Umaña is a 49 year old female with complex past medical history including type 1 diabetes s/p kidney pancreas transplant in 2016 who presents to the emergency department with abdominal pain and drainage from her left laparoscopic port site.  Patient underwent robot assisted left salpingo-ophorectomy, right salpingectomy, and lysis of adhesions with Dr. Melinda Jones on 4/4/2025.  She notes having some pain to her left upper port site, that there was effort in trying to remove her left salpinx and cyst in 1 piece which required a bit of manipulation to this area.  She has had some pain there ever since surgery but has noticed it has become increasingly painful over the past 4 days. Patient states that she has been trying to keep the sites clean, using chlorhexidine soap and gentle cleaning and dressing changes.  She notes that given her history of double organ transplant and immunosuppressed status infections rapidly progress and was concerned as she was heading into the weekend with worsening postoperative pain.  She contacted clinic and was told to come to the ED for evaluation.  Here patient notes that the abdominal pain can get to an 8-9/10 in intensity, especially with movement, getting up, using the bathroom.  She has to splint her abdomen whenever she does any of those things.  Her pain can subside to a 5/10 if she lays flat on her back without moving and applying an ice pack to her abdomen and taking Tylenol.  She states that she stopped taking pain medications because they were no longer helpful and did not want to take higher amounts. No changes in urine. Has had vaginal drainage that she figures is old blood from surgery.  She does not typically have vaginal drainage at baseline. Patient states she is in good spirits but  just in a lot of pain. Has been afebrile.     Patient accompanied by .    Past Medical History  Past Medical History:   Diagnosis Date    Anemia in chronic renal disease     Chronic diarrhea     Chronic edema     Chronic kidney disease, stage 3 (H) 08/05/2021    Diabetes mellitus type 1 (H)     Diabetic gastroparesis (H)     Diabetic peripheral neuropathy (H)     Diabetic retinopathy of both eyes (H)     End stage renal disease (H)     Female stress incontinence     Fibrocystic breast disease     GERD (gastroesophageal reflux disease)     Hypertension, renal     Hypothyroidism due to Hashimoto's thyroiditis     Immunosuppression     Irritable bowel syndrome     Kidney replaced by transplant     Morbid obesity (H)     Pancreas replaced by transplant (H)     Recurrent UTI (urinary tract infection)     Secondary renal hyperparathyroidism     Seizure due to hypoglycemia (H)     Vitamin D deficiency      Past Surgical History:   Procedure Laterality Date    ADENOIDECTOMY      DAVINCI SALPINGO-OOPHORECTOMY INCLUDING BILATERAL Left 4/4/2025    Procedure: EXAM UNDER ANESTHESIA, PELVIS, robot assisted left salpingo-ophorectomy, right salpingectomy, lysis of adhesions;  Surgeon: Rachel Jones MD;  Location: [facility] OR    IR DIALYSIS FISTULOGRAM LEFT      KIDNEY AND PANCREAS COMBINED TRANSPLANTATION  11/07/2016    [facility]    SHOULDER SURGERY Right     WISDOM TOOTH EXTRACTION       acetaminophen (TYLENOL) 325 MG tablet  calcium carbonate 750 MG CHEW  cyclobenzaprine (FLEXERIL) 10 MG tablet  docusate sodium (DSS) 100 MG capsule  furosemide (LASIX) 20 MG tablet  gabapentin (NEURONTIN) 800 MG tablet  LANsoprazole (PREVACID) 15 MG DR capsule  levothyroxine (SYNTHROID/LEVOTHROID) 150 MCG tablet  omeprazole (PRILOSEC) 20 MG DR capsule  senna-docusate (SENOKOT-S/PERICOLACE) 8.6-50 MG tablet  sulfamethoxazole-trimethoprim (BACTRIM DS) 800-160 MG tablet  tacrolimus (GENERIC EQUIVALENT) 1 MG  "capsule  traZODone (DESYREL) 100 MG tablet  cephALEXin (KEFLEX) 500 MG capsule  D3-50 1.25 MG (55859 UT) capsule  fluticasone (FLONASE) 50 MCG/ACT nasal spray  lidocaine (XYLOCAINE) 2 % external gel  mycophenolic acid (GENERIC EQUIVALENT) 180 MG EC tablet  ondansetron (ZOFRAN ODT) 4 MG ODT tab  oxyCODONE (ROXICODONE) 5 MG tablet  predniSONE (DELTASONE) 5 MG tablet      Allergies   Allergen Reactions    Iletin Ii Nph, Hives    Nitrofurantoin Hives, Itching and Nausea    Penicillins Hives    Levothyroxine Other (See Comments)     Patient states hair falls out, and nails break off, and she cannot take this. Only Synthroid.    Contrast Dye Nephrotoxicity and Other (See Comments)     Family History  Family History   Problem Relation Age of Onset    Hypertension Mother     Diabetes Type 2  Mother     Kidney Disease Mother         Glucosuria    Gout Mother     Anesthesia Reaction Father         \"super sensitive\"    Hypertension Father     Diabetes Type 2  Father     Prostate Cancer Father     Sarcoidosis Father     Hypertension Brother     Liver Disease Brother     Breast Cancer Maternal Grandmother     Thrombosis No family hx of      Social History   Social History     Tobacco Use    Smoking status: Former    Smokeless tobacco: Never    Tobacco comments:     Quit 1997   Vaping Use    Vaping status: Never Used   Substance Use Topics    Alcohol use: Not Currently     Alcohol/week: 0.0 - 1.0 standard drinks of alcohol    Drug use: Never      A medically appropriate review of systems was performed with pertinent positives and negatives noted in the HPI, and all other systems negative.    Physical Exam   BP: (!) 151/86  Pulse: 76  Temp: 97.3  F (36.3  C)  Resp: 17  Height: 172.7 cm (5' 8\")  Weight: 127.9 kg (282 lb)  SpO2: 97 %  Physical Exam  Vitals and nursing note reviewed.   Constitutional:       General: She is not in acute distress.     Appearance: She is not diaphoretic.   HENT:      Head: Normocephalic and atraumatic. "   Eyes:      Extraocular Movements: Extraocular movements intact.      Conjunctiva/sclera: Conjunctivae normal.   Cardiovascular:      Rate and Rhythm: Normal rate and regular rhythm.      Heart sounds: Normal heart sounds. No murmur heard.     No friction rub. No gallop.   Pulmonary:      Effort: Pulmonary effort is normal. No respiratory distress.      Breath sounds: Normal breath sounds. No stridor. No wheezing, rhonchi or rales.   Chest:      Chest wall: No tenderness.   Abdominal:      General: Abdomen is flat. Bowel sounds are normal.      Palpations: Abdomen is soft.      Tenderness: There is abdominal tenderness in the left upper quadrant. There is no right CVA tenderness, left CVA tenderness, guarding or rebound. Negative signs include Ledesma's sign, Rovsing's sign, McBurney's sign, psoas sign and obturator sign.      Hernia: No hernia is present.       Musculoskeletal:         General: No tenderness. Normal range of motion.      Cervical back: Normal range of motion and neck supple.   Skin:     General: Skin is warm.      Findings: No rash.           ED Course, Procedures, & Data      Procedures            Results for orders placed or performed during the hospital encounter of 04/18/25   CT Abdomen Pelvis w Contrast     Status: None    Narrative    EXAM: CT ABDOMEN PELVIS W CONTRAST  LOCATION: [facility]  DATE: 4/18/2025    INDICATION: post ope pain and drainage eval for infection  COMPARISON: 7/26/2021.  TECHNIQUE: CT scan of the abdomen and pelvis was performed following injection of IV contrast. Multiplanar reformats were obtained. Dose reduction techniques were used.  CONTRAST: 138 mL Isovue 370    FINDINGS:   LOWER CHEST: Normal.    HEPATOBILIARY: The lung bases are clear.    PANCREAS: Normal.    SPLEEN: Normal.    ADRENAL GLANDS: Normal.    KIDNEYS/BLADDER: Marked atrophy of both kidneys.    BOWEL: Normal.    LYMPH NODES: Normal.    VASCULATURE:  Normal.    PELVIC ORGANS: Left lower quadrant renal transplant. No pelvic fluid.    MUSCULOSKELETAL: Presumed track from a laparoscopic probe left lower abdominal wall. No abscess.      Impression    IMPRESSION:   1.  No soft tissue abscess or fluid.    2.  Marked atrophy of the native kidneys.    3.  Left lower quadrant renal transplant.   INR     Status: Normal   Result Value Ref Range    INR 0.95 0.85 - 1.15   Comprehensive metabolic panel     Status: Abnormal   Result Value Ref Range    Sodium 141 135 - 145 mmol/L    Potassium 4.7 3.4 - 5.3 mmol/L    Carbon Dioxide (CO2) 24 22 - 29 mmol/L    Anion Gap 9 7 - 15 mmol/L    Urea Nitrogen 14.7 6.0 - 20.0 mg/dL    Creatinine 1.17 (H) 0.51 - 0.95 mg/dL    GFR Estimate 57 (L) >60 mL/min/1.73m2    Calcium 9.1 8.8 - 10.4 mg/dL    Chloride 108 (H) 98 - 107 mmol/L    Glucose 94 70 - 99 mg/dL    Alkaline Phosphatase 100 40 - 150 U/L    AST 19 0 - 45 U/L    ALT 13 0 - 50 U/L    Protein Total 7.3 6.4 - 8.3 g/dL    Albumin 3.8 3.5 - 5.2 g/dL    Bilirubin Total 0.2 <=1.2 mg/dL   Procalcitonin     Status: Normal   Result Value Ref Range    Procalcitonin 0.05 <0.50 ng/mL   CRP inflammation     Status: Abnormal   Result Value Ref Range    CRP Inflammation 13.22 (H) <5.00 mg/L   Erythrocyte sedimentation rate auto     Status: Normal   Result Value Ref Range    Erythrocyte Sedimentation Rate 16 0 - 20 mm/hr   UA with Microscopic reflex to Culture     Status: Abnormal    Specimen: Urine, Midstream   Result Value Ref Range    Color Urine Yellow Colorless, Straw, Light Yellow, Yellow    Appearance Urine Slightly Cloudy (A) Clear    Glucose Urine Negative Negative mg/dL    Bilirubin Urine Negative Negative    Ketones Urine Negative Negative mg/dL    Specific Gravity Urine 1.020 1.003 - 1.035    Blood Urine Negative Negative    pH Urine 7.0 5.0 - 7.0    Protein Albumin Urine Trace (A) Negative mg/dL    Urobilinogen Urine 1.0 0.2, 1.0 mg/dL    Nitrite Urine Negative Negative    Leukocyte  Esterase Urine Small (A) Negative    Bacteria Urine Few (A) None Seen /HPF    Mucus Urine Present (A) None Seen /LPF    RBC Urine 5 (H) <=2 /HPF    WBC Urine 161 (H) <=5 /HPF    Squamous Epithelials Urine 5 (H) <=1 /HPF    Transitional Epithelials Urine 1 <=1 /HPF    Narrative    Urine Culture ordered based on laboratory criteria   CBC with platelets and differential     Status: None   Result Value Ref Range    WBC Count 7.8 4.0 - 11.0 10e3/uL    RBC Count 4.50 3.80 - 5.20 10e6/uL    Hemoglobin 13.2 11.7 - 15.7 g/dL    Hematocrit 40.6 35.0 - 47.0 %    MCV 90 78 - 100 fL    MCH 29.3 26.5 - 33.0 pg    MCHC 32.5 31.5 - 36.5 g/dL    RDW 12.8 10.0 - 15.0 %    Platelet Count 251 150 - 450 10e3/uL    % Neutrophils 74 %    % Lymphocytes 17 %    % Monocytes 7 %    % Eosinophils 1 %    % Basophils 1 %    % Immature Granulocytes 1 %    NRBCs per 100 WBC 0 <1 /100    Absolute Neutrophils 5.8 1.6 - 8.3 10e3/uL    Absolute Lymphocytes 1.3 0.8 - 5.3 10e3/uL    Absolute Monocytes 0.5 0.0 - 1.3 10e3/uL    Absolute Eosinophils 0.1 0.0 - 0.7 10e3/uL    Absolute Basophils 0.0 0.0 - 0.2 10e3/uL    Absolute Immature Granulocytes 0.0 <=0.4 10e3/uL    Absolute NRBCs 0.0 10e3/uL   iStat Gases (lactate) venous, POCT     Status: Abnormal   Result Value Ref Range    Lactic Acid POCT 0.9 0.7 - 2.0 mmol/L    Bicarbonate Venous POCT 25 21 - 28 mmol/L    O2 Sat, Venous POCT 83 (H) 70 - 75 %    pCO2 Venous POCT 40 40 - 50 mm Hg    pH Venous POCT 7.40 7.32 - 7.43    pO2 Venous POCT 47 25 - 47 mm Hg    Base Excess/Deficit (+/-) POCT 0.0 -3.0 - 3.0 mmol/L   Extra Tube     Status: None    Narrative    The following orders were created for panel order Extra Tube.  Procedure                               Abnormality         Status                     ---------                               -----------         ------                     Extra Green Top (Barndi...[[id number]]                      Final result                 Please view results for these  tests on the individual orders.   Extra Green Top (Lithium Heparin) Tube     Status: None   Result Value Ref Range    Hold Specimen [facility]    Comprehensive metabolic panel     Status: Abnormal   Result Value Ref Range    Sodium 140 135 - 145 mmol/L    Potassium 4.6 3.4 - 5.3 mmol/L    Carbon Dioxide (CO2) 24 22 - 29 mmol/L    Anion Gap 9 7 - 15 mmol/L    Urea Nitrogen 14.9 6.0 - 20.0 mg/dL    Creatinine 1.16 (H) 0.51 - 0.95 mg/dL    GFR Estimate 58 (L) >60 mL/min/1.73m2    Calcium 9.2 8.8 - 10.4 mg/dL    Chloride 107 98 - 107 mmol/L    Glucose 91 70 - 99 mg/dL    Alkaline Phosphatase 103 40 - 150 U/L    AST 28 0 - 45 U/L    ALT 14 0 - 50 U/L    Protein Total 7.4 6.4 - 8.3 g/dL    Albumin 3.9 3.5 - 5.2 g/dL    Bilirubin Total 0.2 <=1.2 mg/dL   CRP inflammation     Status: Abnormal   Result Value Ref Range    CRP Inflammation 13.24 (H) <5.00 mg/L   Erythrocyte sedimentation rate auto     Status: Abnormal   Result Value Ref Range    Erythrocyte Sedimentation Rate 25 (H) 0 - 20 mm/hr   Procalcitonin     Status: Normal   Result Value Ref Range    Procalcitonin 0.04 <0.50 ng/mL   INR     Status: Normal   Result Value Ref Range    INR 0.98 0.85 - 1.15   CBC with platelets and differential     Status: None   Result Value Ref Range    WBC Count 7.4 4.0 - 11.0 10e3/uL    RBC Count 4.51 3.80 - 5.20 10e6/uL    Hemoglobin 13.6 11.7 - 15.7 g/dL    Hematocrit 40.5 35.0 - 47.0 %    MCV 90 78 - 100 fL    MCH 30.2 26.5 - 33.0 pg    MCHC 33.6 31.5 - 36.5 g/dL    RDW 12.7 10.0 - 15.0 %    Platelet Count 238 150 - 450 10e3/uL    % Neutrophils 73 %    % Lymphocytes 19 %    % Monocytes 7 %    % Eosinophils 0 %    % Basophils 1 %    % Immature Granulocytes 1 %    NRBCs per 100 WBC 0 <1 /100    Absolute Neutrophils 5.4 1.6 - 8.3 10e3/uL    Absolute Lymphocytes 1.4 0.8 - 5.3 10e3/uL    Absolute Monocytes 0.5 0.0 - 1.3 10e3/uL    Absolute Eosinophils 0.0 0.0 - 0.7 10e3/uL    Absolute Basophils 0.1 0.0 - 0.2 10e3/uL    Absolute Immature  Granulocytes 0.0 <=0.4 10e3/uL    Absolute NRBCs 0.0 10e3/uL   Extra Tube     Status: None    Narrative    The following orders were created for panel order Extra Tube.  Procedure                               Abnormality         Status                     ---------                               -----------         ------                     Extra Green Top (Lithiu...[[id number]]                      Final result                 Please view results for these tests on the individual orders.   Extra Green Top (Lithium Heparin) Tube     Status: None   Result Value Ref Range    Hold Specimen [facility]    HCG qualitative urine     Status: Normal   Result Value Ref Range    hCG Urine Qualitative Negative Negative   CBC with platelets differential     Status: None    Narrative    The following orders were created for panel order CBC with platelets differential.  Procedure                               Abnormality         Status                     ---------                               -----------         ------                     CBC with platelets and ...[[id number]]                      Final result               RBC and Platelet Morpho...[[id number]]                                                   Please view results for these tests on the individual orders.   CBC with Platelets & Differential     Status: None    Narrative    The following orders were created for panel order CBC with Platelets & Differential.  Procedure                               Abnormality         Status                     ---------                               -----------         ------                     CBC with platelets and ...[[id number]]                      Final result                 Please view results for these tests on the individual orders.     Medications   iopamidol (ISOVUE-370) solution 100 mL (138 mLs Intravenous $Given 4/18/25 2048)   sodium chloride 0.9 % bag for CT scan flush (70 mLs Intravenous $Given 4/18/25 2049)    HYDROmorphone (PF) (DILAUDID) injection 0.5-1 mg (0.5 mg Intravenous $Given 4/18/25 1345)     Labs Ordered and Resulted from Time of ED Arrival to Time of ED Departure   COMPREHENSIVE METABOLIC PANEL - Abnormal       Result Value    Sodium 141      Potassium 4.7      Carbon Dioxide (CO2) 24      Anion Gap 9      Urea Nitrogen 14.7      Creatinine 1.17 (*)     GFR Estimate 57 (*)     Calcium 9.1      Chloride 108 (*)     Glucose 94      Alkaline Phosphatase 100      AST 19      ALT 13      Protein Total 7.3      Albumin 3.8      Bilirubin Total 0.2     CRP INFLAMMATION - Abnormal    CRP Inflammation 13.22 (*)    ROUTINE UA WITH MICROSCOPIC REFLEX TO CULTURE - Abnormal    Color Urine Yellow      Appearance Urine Slightly Cloudy (*)     Glucose Urine Negative      Bilirubin Urine Negative      Ketones Urine Negative      Specific Gravity Urine 1.020      Blood Urine Negative      pH Urine 7.0      Protein Albumin Urine Trace (*)     Urobilinogen Urine 1.0      Nitrite Urine Negative      Leukocyte Esterase Urine Small (*)     Bacteria Urine Few (*)     Mucus Urine Present (*)     RBC Urine 5 (*)     WBC Urine 161 (*)     Squamous Epithelials Urine 5 (*)     Transitional Epithelials Urine 1     ISTAT GASES LACTATE VENOUS POCT - Abnormal    Lactic Acid POCT 0.9      Bicarbonate Venous POCT 25      O2 Sat, Venous POCT 83 (*)     pCO2 Venous POCT 40      pH Venous POCT 7.40      pO2 Venous POCT 47      Base Excess/Deficit (+/-) POCT 0.0     COMPREHENSIVE METABOLIC PANEL - Abnormal    Sodium 140      Potassium 4.6      Carbon Dioxide (CO2) 24      Anion Gap 9      Urea Nitrogen 14.9      Creatinine 1.16 (*)     GFR Estimate 58 (*)     Calcium 9.2      Chloride 107      Glucose 91      Alkaline Phosphatase 103      AST 28      ALT 14      Protein Total 7.4      Albumin 3.9      Bilirubin Total 0.2     CRP INFLAMMATION - Abnormal    CRP Inflammation 13.24 (*)    ERYTHROCYTE SEDIMENTATION RATE AUTO - Abnormal    Erythrocyte  Sedimentation Rate 25 (*)    INR - Normal    INR 0.95     PROCALCITONIN - Normal    Procalcitonin 0.05     ERYTHROCYTE SEDIMENTATION RATE AUTO - Normal    Erythrocyte Sedimentation Rate 16     PROCALCITONIN - Normal    Procalcitonin 0.04     INR - Normal    INR 0.98     HCG QUALITATIVE URINE - Normal    hCG Urine Qualitative Negative     CBC WITH PLATELETS AND DIFFERENTIAL    WBC Count 7.8      RBC Count 4.50      Hemoglobin 13.2      Hematocrit 40.6      MCV 90      MCH 29.3      MCHC 32.5      RDW 12.8      Platelet Count 251      % Neutrophils 74      % Lymphocytes 17      % Monocytes 7      % Eosinophils 1      % Basophils 1      % Immature Granulocytes 1      NRBCs per 100 WBC 0      Absolute Neutrophils 5.8      Absolute Lymphocytes 1.3      Absolute Monocytes 0.5      Absolute Eosinophils 0.1      Absolute Basophils 0.0      Absolute Immature Granulocytes 0.0      Absolute NRBCs 0.0     CBC WITH PLATELETS AND DIFFERENTIAL    WBC Count 7.4      RBC Count 4.51      Hemoglobin 13.6      Hematocrit 40.5      MCV 90      MCH 30.2      MCHC 33.6      RDW 12.7      Platelet Count 238      % Neutrophils 73      % Lymphocytes 19      % Monocytes 7      % Eosinophils 0      % Basophils 1      % Immature Granulocytes 1      NRBCs per 100 WBC 0      Absolute Neutrophils 5.4      Absolute Lymphocytes 1.4      Absolute Monocytes 0.5      Absolute Eosinophils 0.0      Absolute Basophils 0.1      Absolute Immature Granulocytes 0.0      Absolute NRBCs 0.0     HCG QUALITATIVE PREGNANCY   URINE CULTURE     CT Abdomen Pelvis w Contrast   Final Result   IMPRESSION:    1.  No soft tissue abscess or fluid.      2.  Marked atrophy of the native kidneys.      3.  Left lower quadrant renal transplant.             Critical care was not performed.     Medical Decision Making  The patient's presentation was of moderate complexity (an acute illness with systemic symptoms).    The patient's evaluation involved:  ordering and/or review of  3+ test(s) in this encounter (see separate area of note for details)    The patient's management necessitated moderate risk (prescription drug management including medications given in the ED).    Assessment & Plan    Acute post ope pain and wound drainage, s/p left salpingo-oophorectomy 16 days ago, s/p pancreas and kidney transplant, labs with minimal elevation of inflammatory markers, CT no obvious abscess per Rad but abd wall inflammation, Gyn consult-explored the wound, will discharge with keflex for  probable early wound infection, follow up with Gyn.    I have reviewed the nursing notes. I have reviewed the findings, diagnosis, plan and need for follow up with the patient.    Discharge Medication List as of 4/18/2025 11:47 PM        START taking these medications    Details   cephALEXin (KEFLEX) 500 MG capsule Take 1 capsule (500 mg) by mouth 4 times daily for 7 days., Disp-28 capsule, R-0, E-Prescribe             Final diagnoses:   Wound infection   Acute post-operative pain     I, Romana Shell, am serving as a trained medical scribe to document services personally performed by Topher Dee MD based on the provider's statements to me on 4/18/25.  This document has been checked and approved by the attending provider.    I, Topher Dee MD, was physically present and have reviewed and verified the accuracy of this note documented by Romana Shell, medical scribe.      Topher Dee MD     [facility] EMERGENCY DEPARTMENT  4/18/2025     Topher Dee MD  04/19/25 0011

## 2025-04-19 NOTE — DISCHARGE INSTRUCTIONS
Please make an appointment to follow up with your Gynecologist as soon as possible even if entirely better.

## 2025-04-22 ENCOUNTER — TELEPHONE (OUTPATIENT)
Dept: NURSING | Facility: CLINIC | Age: 50
End: 2025-04-22
Payer: COMMERCIAL

## 2025-04-22 LAB
BACTERIA UR CULT: ABNORMAL
BACTERIA UR CULT: ABNORMAL

## 2025-04-22 NOTE — LETTER
April 22, 2025        Lisbeth Umaña  [address] [place] [address]          Dear Lisbeth Umaña:    You were seen in the [facility] Emergency Department at [facility]) on 4/18/2025.  We are unable to reach you by phone, so we are sending you this letter.     It is important that you call [facility] Emergency Department lab result nurse at [phone], as we have information to relay to you AND/OR we MAY have to make some changes in your treatment.    Best time to call back is between 9AM and 5:30PM, 7 days a week.      Sincerely,     [facility] Emergency Department Lab Result RN  [phone]

## 2025-04-22 NOTE — TELEPHONE ENCOUNTER
[facility] ([facility])    Reason for call: Lab Result Notification     Lab Result (including Rx patient on, if applicable).  If culture, copy of lab report at bottom.  Lab Result: Urine Culture - See Below    ED Rx: cephALEXin (KEFLEX) 500 MG capsule - Take 1 capsule (500 mg) by mouth 4 times daily for 7 days (Prescribed for a wound infection)  50,000-100,000 CFU/mL Staphylococcus epidermidis Abnormal  (SUSCEPTIBLE)    Creatinine Level (mg/dl)   Creatinine   Date Value Ref Range Status   04/18/2025 1.16 (H) 0.51 - 0.95 mg/dL Final    Creatinine clearance (ml/min), if applicable    Serum creatinine: 1.16 mg/dL (H) 04/18/25 1610  Estimated creatinine clearance: 83.8 mL/min (A)     ED Symptoms: Presented to the ED with abdominal pain. Patient also had abdominal surgery on 4/4/2025 and has drainage form the left laparoscopic site.     Current Symptoms: Unable to assess.     RN Recommendations/Instructions per [facility] ED lab result protocol:   [facility] ED lab result protocol utilized: Urine Culture  Continue antibiotic/medication as prescribed: Cephalexin    Unable to reach patient/caregiver.     Left voicemail message requesting a call back to [phone] between 9 a.m. and 5:30 p.m. for patient's ED/ lab results.    Letter pended to be sent via [email or web address].       RUDY ROSENBAUM RN

## 2025-04-27 ENCOUNTER — HEALTH MAINTENANCE LETTER (OUTPATIENT)
Age: 50
End: 2025-04-27

## 2025-04-30 ENCOUNTER — OFFICE VISIT (OUTPATIENT)
Dept: OBGYN | Facility: CLINIC | Age: 50
End: 2025-04-30
Attending: OBSTETRICS & GYNECOLOGY
Payer: COMMERCIAL

## 2025-04-30 VITALS — SYSTOLIC BLOOD PRESSURE: 122 MMHG | DIASTOLIC BLOOD PRESSURE: 80 MMHG | HEART RATE: 80 BPM | TEMPERATURE: 98.2 F

## 2025-04-30 DIAGNOSIS — N39.0 URINARY TRACT INFECTION WITHOUT HEMATURIA, SITE UNSPECIFIED: Primary | ICD-10-CM

## 2025-04-30 PROCEDURE — 87086 URINE CULTURE/COLONY COUNT: CPT | Performed by: OBSTETRICS & GYNECOLOGY

## 2025-04-30 NOTE — NURSING NOTE
Post op 4-4-25 bilateral salpingectomy-  was in ER 4-18-25 wound infection   area opened area about an inch  having red drainage   no fever chills   some pain 6

## 2025-05-01 LAB — BACTERIA UR CULT: NORMAL

## 2025-06-07 ENCOUNTER — MYC MEDICAL ADVICE (OUTPATIENT)
Dept: OTHER | Age: 50
End: 2025-06-07

## 2025-08-08 ENCOUNTER — TELEPHONE (OUTPATIENT)
Dept: TRANSPLANT | Facility: CLINIC | Age: 50
End: 2025-08-08
Payer: MEDICARE

## 2025-08-10 ENCOUNTER — HEALTH MAINTENANCE LETTER (OUTPATIENT)
Age: 50
End: 2025-08-10

## 2025-09-03 DIAGNOSIS — Z94.0 KIDNEY TRANSPLANTED: Primary | ICD-10-CM

## 2025-09-03 RX ORDER — MYCOPHENOLIC ACID 180 MG/1
540 TABLET, DELAYED RELEASE ORAL 2 TIMES DAILY
Qty: 180 TABLET | Refills: 0 | Status: SHIPPED | OUTPATIENT
Start: 2025-09-03

## 2025-09-03 RX ORDER — PREDNISONE 5 MG/1
5 TABLET ORAL DAILY
Qty: 30 TABLET | Refills: 0 | Status: SHIPPED | OUTPATIENT
Start: 2025-09-03

## 2025-09-03 RX ORDER — TACROLIMUS 1 MG/1
CAPSULE ORAL
Qty: 150 CAPSULE | Refills: 0 | Status: SHIPPED | OUTPATIENT
Start: 2025-09-03

## (undated) DEVICE — DAVINCI XI SEAL UNIVERSAL 5-12MM 470500

## (undated) DEVICE — DAVINCI XI MONOPOLAR SCISSORS HOT SHEARS 8MM 470179

## (undated) DEVICE — NDL INSUFFLATION 13GA 120MM C2201

## (undated) DEVICE — SU VICRYL 2-0 CT-2 27" J333H

## (undated) DEVICE — PROTECTOR ARM ONE-STEP TRENDELENBURG 40418 (COI)

## (undated) DEVICE — DAVINCI HOT SHEARS TIP COVER  400180

## (undated) DEVICE — SU VICRYL 4-0 PS-2 18" UND J496H

## (undated) DEVICE — Device

## (undated) DEVICE — SPECIMEN TRAP MUCOUS 40ML LUKI C30200A

## (undated) DEVICE — ENDO TROCAR OPTICAL ACCESS KII LOW PROFILE 05X100MM CTR22

## (undated) DEVICE — SUCTION MANIFOLD NEPTUNE 2 SYS 4 PORT 0702-020-000

## (undated) DEVICE — PREP DYNA-HEX 4% CHG SCRUB 4OZ BOTTLE MDS098710

## (undated) DEVICE — WIPES FOLEY CARE SURESTEP PROVON DFC100

## (undated) DEVICE — SYR 10ML FINGER CONTROL W/O NDL 309695

## (undated) DEVICE — DAVINCI XI DRAPE COLUMN 470341

## (undated) DEVICE — DAVINCI XI ESU BIPOLAR 3MM ENDOWRIST FENESTRATED EXT 471205

## (undated) DEVICE — PACK GOWN 3/PK DISP XL SBA32GPFCB

## (undated) DEVICE — ADH LIQUID MASTISOL TOPICAL VIAL 2-3ML 0523-48

## (undated) DEVICE — LINEN TOWEL PACK X6 WHITE 5487

## (undated) DEVICE — ANTIFOG SOLUTION SEE SHARP 150M TROCAR SWABS 30978 (COI)

## (undated) DEVICE — DRAPE MAYO STAND 23X54 8337

## (undated) DEVICE — SYR 01ML 27GA 0.5" NDL TBC 309623

## (undated) DEVICE — UTERINE MANIPULATOR RUMI 6.7MMX8CM UMB678

## (undated) DEVICE — ENDO OBTURATOR ACCESS PORT BLADELESS 8X100MM IAS8-100LP

## (undated) DEVICE — ENDO TROCAR FIRST ENTRY KII FIOS Z-THRD 12X100MM CTF73

## (undated) DEVICE — ESU GROUND PAD ADULT W/CORD E7507

## (undated) DEVICE — GLOVE BIOGEL PI MICRO INDICATOR UNDERGLOVE SZ 7.0 48970

## (undated) DEVICE — TUBING FILTER TRI-LUMEN AIRSEAL ASC-EVAC1

## (undated) DEVICE — TUBING CONMED AIRSEAL SMOKE EVAC INSUFFLATION ASM-EVAC

## (undated) DEVICE — SU VICRYL+ 0 54 UNDYED VCP608H

## (undated) DEVICE — DRSG PRIMAPORE 02X3" 7133

## (undated) DEVICE — KIT PATIENT POSITIONING PIGAZZI LATEX FREE 40580

## (undated) DEVICE — CATH TRAY FOLEY SURESTEP 16FR W/URNE MTR STLK LATEX A303316A

## (undated) DEVICE — SYR PISTON URETHRAL 60ML 68000

## (undated) DEVICE — DEVICE SUTURE PASSER 14GA WECK EFX EFXSP2

## (undated) DEVICE — JELLY LUBRICATING SURGILUBE 2OZ TUBE

## (undated) DEVICE — GLOVE BIOGEL PI MICRO SZ 6.5 48565

## (undated) DEVICE — DAVINCI XI DRIVER NDL MEGA SUTURECUT 8MM EXT 471309

## (undated) DEVICE — DRAPE SHEET REV FOLD 3/4 9349

## (undated) DEVICE — SU VICRYL+ 0 27 UR6 VLT VCP603H

## (undated) DEVICE — SUCTION IRR STRYKERFLOW II W/TIP 250-070-520

## (undated) DEVICE — LINEN TOWEL PACK X30 5481

## (undated) DEVICE — ESU LIGASURE MARYLAND VESSEL LAP 44CM XLONG LF1944

## (undated) DEVICE — SYR 10ML LL W/O NDL 302995

## (undated) DEVICE — ENDO POUCH UNIV RETRIEVAL SYSTEM INZII 10MM CD001

## (undated) DEVICE — DAVINCI XI DRAPE ARM 470015

## (undated) DEVICE — DAVINCI XI OBTURATOR BLADELESS 8MM 470359

## (undated) DEVICE — CATH TRAY FOLEY 16FR W/URINE METER STATLOCK 303316A

## (undated) DEVICE — LINEN GOWN XLG 5407

## (undated) DEVICE — ADAPTER DRAPE ALLY AU-AD

## (undated) DEVICE — KOH COLPOTOMIZER OCCLUDER  CPO-6

## (undated) RX ORDER — DEXAMETHASONE SODIUM PHOSPHATE 4 MG/ML
INJECTION, SOLUTION INTRA-ARTICULAR; INTRALESIONAL; INTRAMUSCULAR; INTRAVENOUS; SOFT TISSUE
Status: DISPENSED
Start: 2025-04-04

## (undated) RX ORDER — ACETAMINOPHEN 325 MG/1
TABLET ORAL
Status: DISPENSED
Start: 2024-12-17

## (undated) RX ORDER — FENTANYL CITRATE 50 UG/ML
INJECTION, SOLUTION INTRAMUSCULAR; INTRAVENOUS
Status: DISPENSED
Start: 2025-04-04

## (undated) RX ORDER — GABAPENTIN 100 MG/1
CAPSULE ORAL
Status: DISPENSED
Start: 2025-04-04

## (undated) RX ORDER — PROPOFOL 10 MG/ML
INJECTION, EMULSION INTRAVENOUS
Status: DISPENSED
Start: 2024-12-17

## (undated) RX ORDER — BUPIVACAINE HYDROCHLORIDE 2.5 MG/ML
INJECTION, SOLUTION EPIDURAL; INFILTRATION; INTRACAUDAL; PERINEURAL
Status: DISPENSED
Start: 2025-04-04

## (undated) RX ORDER — HYDROMORPHONE HYDROCHLORIDE 1 MG/ML
INJECTION, SOLUTION INTRAMUSCULAR; INTRAVENOUS; SUBCUTANEOUS
Status: DISPENSED
Start: 2025-04-04

## (undated) RX ORDER — ONDANSETRON 4 MG/1
TABLET, ORALLY DISINTEGRATING ORAL
Status: DISPENSED
Start: 2025-04-04

## (undated) RX ORDER — FENTANYL CITRATE 50 UG/ML
INJECTION, SOLUTION INTRAMUSCULAR; INTRAVENOUS
Status: DISPENSED
Start: 2024-12-17

## (undated) RX ORDER — DEXAMETHASONE SODIUM PHOSPHATE 4 MG/ML
INJECTION, SOLUTION INTRA-ARTICULAR; INTRALESIONAL; INTRAMUSCULAR; INTRAVENOUS; SOFT TISSUE
Status: DISPENSED
Start: 2024-12-17

## (undated) RX ORDER — ESMOLOL HYDROCHLORIDE 10 MG/ML
INJECTION INTRAVENOUS
Status: DISPENSED
Start: 2024-12-17

## (undated) RX ORDER — ACETAMINOPHEN 325 MG/1
TABLET ORAL
Status: DISPENSED
Start: 2025-04-04

## (undated) RX ORDER — CEFAZOLIN SODIUM 1 G/3ML
INJECTION, POWDER, FOR SOLUTION INTRAMUSCULAR; INTRAVENOUS
Status: DISPENSED
Start: 2024-12-17

## (undated) RX ORDER — GABAPENTIN 300 MG/1
CAPSULE ORAL
Status: DISPENSED
Start: 2025-04-04

## (undated) RX ORDER — GLYCOPYRROLATE 0.2 MG/ML
INJECTION, SOLUTION INTRAMUSCULAR; INTRAVENOUS
Status: DISPENSED
Start: 2024-12-17

## (undated) RX ORDER — ONDANSETRON 2 MG/ML
INJECTION INTRAMUSCULAR; INTRAVENOUS
Status: DISPENSED
Start: 2025-04-04

## (undated) RX ORDER — HYDROMORPHONE HCL IN WATER/PF 6 MG/30 ML
PATIENT CONTROLLED ANALGESIA SYRINGE INTRAVENOUS
Status: DISPENSED
Start: 2025-04-04

## (undated) RX ORDER — MEPERIDINE HYDROCHLORIDE 25 MG/ML
INJECTION INTRAMUSCULAR; INTRAVENOUS; SUBCUTANEOUS
Status: DISPENSED
Start: 2025-04-04

## (undated) RX ORDER — CEFAZOLIN SODIUM/WATER 3 G/30 ML
SYRINGE (ML) INTRAVENOUS
Status: DISPENSED
Start: 2024-12-17

## (undated) RX ORDER — DIPHENHYDRAMINE HYDROCHLORIDE 50 MG/ML
INJECTION, SOLUTION INTRAMUSCULAR; INTRAVENOUS
Status: DISPENSED
Start: 2025-04-04

## (undated) RX ORDER — FENTANYL CITRATE-0.9 % NACL/PF 10 MCG/ML
PLASTIC BAG, INJECTION (ML) INTRAVENOUS
Status: DISPENSED
Start: 2025-04-04

## (undated) RX ORDER — PROPOFOL 10 MG/ML
INJECTION, EMULSION INTRAVENOUS
Status: DISPENSED
Start: 2025-04-04

## (undated) RX ORDER — PHENAZOPYRIDINE HYDROCHLORIDE 200 MG/1
TABLET, FILM COATED ORAL
Status: DISPENSED
Start: 2024-12-17

## (undated) RX ORDER — ONDANSETRON 2 MG/ML
INJECTION INTRAMUSCULAR; INTRAVENOUS
Status: DISPENSED
Start: 2024-12-17